# Patient Record
Sex: MALE | Race: ASIAN | Employment: FULL TIME
[De-identification: names, ages, dates, MRNs, and addresses within clinical notes are randomized per-mention and may not be internally consistent; named-entity substitution may affect disease eponyms.]

---

## 2024-08-09 ENCOUNTER — OFFICE VISIT (OUTPATIENT)
Facility: CLINIC | Age: 24
End: 2024-08-09
Payer: COMMERCIAL

## 2024-08-09 VITALS
BODY MASS INDEX: 19.37 KG/M2 | HEIGHT: 68 IN | DIASTOLIC BLOOD PRESSURE: 80 MMHG | WEIGHT: 127.8 LBS | RESPIRATION RATE: 16 BRPM | HEART RATE: 68 BPM | OXYGEN SATURATION: 98 % | SYSTOLIC BLOOD PRESSURE: 116 MMHG | TEMPERATURE: 98.6 F

## 2024-08-09 DIAGNOSIS — B00.9 HSV-1 INFECTION: ICD-10-CM

## 2024-08-09 DIAGNOSIS — K13.70 ORAL LESION: Primary | ICD-10-CM

## 2024-08-09 DIAGNOSIS — L30.9 DERMATITIS: ICD-10-CM

## 2024-08-09 PROCEDURE — 99204 OFFICE O/P NEW MOD 45 MIN: CPT | Performed by: NURSE PRACTITIONER

## 2024-08-09 SDOH — ECONOMIC STABILITY: FOOD INSECURITY: WITHIN THE PAST 12 MONTHS, THE FOOD YOU BOUGHT JUST DIDN'T LAST AND YOU DIDN'T HAVE MONEY TO GET MORE.: NEVER TRUE

## 2024-08-09 SDOH — ECONOMIC STABILITY: FOOD INSECURITY: WITHIN THE PAST 12 MONTHS, YOU WORRIED THAT YOUR FOOD WOULD RUN OUT BEFORE YOU GOT MONEY TO BUY MORE.: NEVER TRUE

## 2024-08-09 SDOH — ECONOMIC STABILITY: INCOME INSECURITY: HOW HARD IS IT FOR YOU TO PAY FOR THE VERY BASICS LIKE FOOD, HOUSING, MEDICAL CARE, AND HEATING?: NOT HARD AT ALL

## 2024-08-09 SDOH — ECONOMIC STABILITY: HOUSING INSECURITY
IN THE LAST 12 MONTHS, WAS THERE A TIME WHEN YOU DID NOT HAVE A STEADY PLACE TO SLEEP OR SLEPT IN A SHELTER (INCLUDING NOW)?: NO

## 2024-08-09 ASSESSMENT — ENCOUNTER SYMPTOMS
NAUSEA: 0
DIARRHEA: 0
VOMITING: 0
SORE THROAT: 1
SHORTNESS OF BREATH: 0
BLOOD IN STOOL: 0

## 2024-08-09 ASSESSMENT — PATIENT HEALTH QUESTIONNAIRE - PHQ9
2. FEELING DOWN, DEPRESSED OR HOPELESS: NOT AT ALL
SUM OF ALL RESPONSES TO PHQ QUESTIONS 1-9: 0
SUM OF ALL RESPONSES TO PHQ9 QUESTIONS 1 & 2: 0
SUM OF ALL RESPONSES TO PHQ QUESTIONS 1-9: 0
SUM OF ALL RESPONSES TO PHQ QUESTIONS 1-9: 0
1. LITTLE INTEREST OR PLEASURE IN DOING THINGS: NOT AT ALL
SUM OF ALL RESPONSES TO PHQ QUESTIONS 1-9: 0

## 2024-08-09 NOTE — ASSESSMENT & PLAN NOTE
Almost resolved, wound culture pending.  Advise using over-the-counter Bacitracin/polymyxin ointment prn

## 2024-08-09 NOTE — ASSESSMENT & PLAN NOTE
Cultures pending, he would like to make 1 week follow-up to discuss results.  Differential includes viral vs. bacterial, will hold on any treatment at this time

## 2024-08-09 NOTE — PROGRESS NOTES
Rosalva Toro is a 23 y.o. male     Chief Complaint   Patient presents with    New Patient     Discuss minute clinic results       /80 (Site: Left Upper Arm, Position: Sitting, Cuff Size: Medium Adult)   Pulse 68   Temp 98.6 °F (37 °C) (Oral)   Resp 16   Ht 1.727 m (5' 8\")   Wt 58 kg (127 lb 12.8 oz)   SpO2 98%   BMI 19.43 kg/m²     Health Maintenance Due   Topic Date Due    Hepatitis B vaccine (1 of 3 - 3-dose series) Never done    COVID-19 Vaccine (1) Never done    Varicella vaccine (1 of 2 - 2-dose childhood series) Never done    HPV vaccine (1 - Male 2-dose series) Never done    Depression Screen  Never done    HIV screen  Never done    Hepatitis C screen  Never done    DTaP/Tdap/Td vaccine (1 - Tdap) Never done    Flu vaccine (1) Never done         \"Have you been to the ER, urgent care clinic since your last visit?  Hospitalized since your last visit?\"    YES - When: approximately 8 days ago.  Where and Why: [facility] in mouth.    “Have you seen or consulted any other health care providers outside of [facility] since your last visit?”    NO                     
Surgical History:   Procedure Laterality Date    COLONOSCOPY  06/04/2024     Social History     Socioeconomic History    Marital status: Single   Tobacco Use    Smoking status: Never     Passive exposure: Never    Smokeless tobacco: Never   Vaping Use    Vaping Use: Never used   Substance and Sexual Activity    Alcohol use: Never    Drug use: Never     Social Determinants of Health     Financial Resource Strain: Low Risk  (8/9/2024)    Overall Financial Resource Strain (CARDIA)     Difficulty of Paying Living Expenses: Not hard at all   Food Insecurity: No Food Insecurity (8/9/2024)    Hunger Vital Sign     Worried About Running Out of Food in the Last Year: Never true     Ran Out of Food in the Last Year: Never true   Transportation Needs: Unknown (8/9/2024)    PRAPARE - Transportation     Lack of Transportation (Non-Medical): No   Housing Stability: Unknown (8/9/2024)    Housing Stability Vital Sign     Unstable Housing in the Last Year: No     No current outpatient medications on file.     No current facility-administered medications for this visit.     No Known Allergies    Prior to Admission medications    Not on File        Vitals:    08/09/24 0909   BP: 116/80   Site: Left Upper Arm   Position: Sitting   Cuff Size: Medium Adult   Pulse: 68   Resp: 16   Temp: 98.6 °F (37 °C)   TempSrc: Oral   SpO2: 98%   Weight: 58 kg (127 lb 12.8 oz)   Height: 1.727 m (5' 8\")       Body mass index is 19.43 kg/m².    Assessment and Plan    1. Oral lesion  Assessment & Plan:  Cultures pending, he would like to make 1 week follow-up to discuss results.  Differential includes viral vs. bacterial, will hold on any treatment at this time   Orders:  -     Culture, HSV, Reflex to typing; Future  -     Culture, Throat; Future  2. HSV-1 infection  Assessment & Plan:  Reviewed outside labs and discussed his results in depth.  Will discuss potential treatment as needed at follow-up   Orders:  -     Culture, HSV, Reflex to typing;

## 2024-08-09 NOTE — ASSESSMENT & PLAN NOTE
Reviewed outside labs and discussed his results in depth.  Will discuss potential treatment as needed at follow-up

## 2024-08-11 LAB
BACTERIA SPEC CULT: ABNORMAL
GRAM STN SPEC: ABNORMAL
GRAM STN SPEC: ABNORMAL
HSV SPEC CULT: NORMAL
SERVICE CMNT-IMP: ABNORMAL
SERVICE CMNT-IMP: ABNORMAL
SPECIMEN SOURCE: NORMAL

## 2024-08-12 DIAGNOSIS — J02.0 ACUTE STREPTOCOCCAL PHARYNGITIS: Primary | ICD-10-CM

## 2024-08-12 RX ORDER — CEPHALEXIN 500 MG/1
500 CAPSULE ORAL 2 TIMES DAILY
Qty: 20 CAPSULE | Refills: 0 | Status: SHIPPED | OUTPATIENT
Start: 2024-08-12 | End: 2024-08-22

## 2024-08-21 NOTE — PROGRESS NOTES
[facility] [place]  Office Note  History of present illness:Rosalva Toro is a 24 y.o. male presenting for Follow-up (Followup labs)  1 week follow-up for discussion of labs preferred by patient.  He was recently treated for strep infection of the throat and skin with cephalexin x 10 days.  Tested negative for HSV of mouth and throat.     He reports today his throat is feeling much better and skin/rash problem has improved almost resolved.  He reports still having 2 days worth of medication left.  He reports he may have burned the roof of his mouth on hot pizza.  He has new concern today for hypopigmentation of his penis he has noticed over the past couple of weeks, now pink. Associated irritation intermittently.  He denies any discharge.    Many questions and concerns surrounding his recent HSV antibody test.  He has been tested several times for additional STI-negative.  He was able to have recent male sexual encounter acquaintance test for STI.  Was positive for HSV as well, mild syphilis, and gonorrhea.  He would like to retest his HSV antibody test and receive initial treatment for first outbreak.    He denies recent fever, chills.    Review of Systems   Constitutional: Negative.    Respiratory:  Negative for shortness of breath.    Cardiovascular:  Negative for chest pain and palpitations.   Gastrointestinal:  Negative for blood in stool, diarrhea, nausea and vomiting.   Hematological:  Negative for adenopathy.         No past medical history on file.    No Known Allergies     Prior to Admission medications    Medication Sig Start Date End Date Taking? Authorizing Provider   cephALEXin (KEFLEX) 500 MG capsule Take 1 capsule by mouth 2 times daily for 10 days 8/12/24 8/22/24  Nishant Dee APRN - NP        Vitals:    08/22/24 1113   BP: 124/83   Site: Left Upper Arm   Position: Sitting   Pulse: 80   Resp: 16   Temp: 98.3 °F (36.8 °C)   SpO2: 100%

## 2024-08-22 ENCOUNTER — OFFICE VISIT (OUTPATIENT)
Facility: CLINIC | Age: 24
End: 2024-08-22
Payer: COMMERCIAL

## 2024-08-22 VITALS
TEMPERATURE: 98.3 F | RESPIRATION RATE: 16 BRPM | WEIGHT: 125.6 LBS | HEART RATE: 80 BPM | BODY MASS INDEX: 19.1 KG/M2 | OXYGEN SATURATION: 100 % | SYSTOLIC BLOOD PRESSURE: 124 MMHG | DIASTOLIC BLOOD PRESSURE: 83 MMHG

## 2024-08-22 DIAGNOSIS — R23.9 SKIN CHANGE: ICD-10-CM

## 2024-08-22 DIAGNOSIS — J02.0 ACUTE STREPTOCOCCAL PHARYNGITIS: ICD-10-CM

## 2024-08-22 DIAGNOSIS — L30.9 DERMATITIS: ICD-10-CM

## 2024-08-22 DIAGNOSIS — B00.9 HSV-1 INFECTION: Primary | ICD-10-CM

## 2024-08-22 PROCEDURE — 99214 OFFICE O/P EST MOD 30 MIN: CPT | Performed by: NURSE PRACTITIONER

## 2024-08-22 RX ORDER — VALACYCLOVIR HYDROCHLORIDE 1 G/1
1000 TABLET, FILM COATED ORAL 2 TIMES DAILY
Qty: 20 TABLET | Refills: 0 | Status: SHIPPED | OUTPATIENT
Start: 2024-08-22 | End: 2024-09-01

## 2024-08-22 ASSESSMENT — ENCOUNTER SYMPTOMS
BLOOD IN STOOL: 0
VOMITING: 0
DIARRHEA: 0
NAUSEA: 0
SHORTNESS OF BREATH: 0

## 2024-08-22 NOTE — PATIENT INSTRUCTIONS
[facility] [phone]   [facility] [phone]  [facility]  [phone]   [facility] [place]  [phone]

## 2024-08-22 NOTE — PROGRESS NOTES
Chief Complaint   Patient presents with    Follow-up     Followup labs    1. Have you been to the ER, urgent care clinic since your last visit?  Hospitalized since your last visit?no    2. Have you seen or consulted any other health care providers outside of the [facility] since your last visit?  Include any pap smears or colon screening. no

## 2024-08-25 LAB
HSV1 IGG SER IA-ACNC: 2.44 INDEX (ref 0–0.9)
HSV2 IGG SER IA-ACNC: <0.91 INDEX (ref 0–0.9)

## 2024-08-26 ENCOUNTER — TELEPHONE (OUTPATIENT)
Facility: CLINIC | Age: 24
End: 2024-08-26

## 2024-08-26 NOTE — TELEPHONE ENCOUNTER
Patient wants to know if he should start the antiviral now or wait since the IGG is still below 3.55. Also, if the antiviral is started, should he push Friday's appt back?

## 2024-08-26 NOTE — TELEPHONE ENCOUNTER
Patient is requesting a call back from JUNE Dee. He saw his lab results in My Chart and would like to know when to start his medication. Also he would like to know if he needs to reschedule his nurse visit on 8/30/24.     [phone]  He will not be available 2:30- 3:00 pm.

## 2024-09-16 DIAGNOSIS — B00.9 HERPES SIMPLEX: Primary | ICD-10-CM

## 2024-09-16 RX ORDER — VALACYCLOVIR HYDROCHLORIDE 1 G/1
1000 TABLET, FILM COATED ORAL 2 TIMES DAILY
Qty: 20 TABLET | Refills: 0 | Status: SHIPPED | OUTPATIENT
Start: 2024-09-16 | End: 2024-09-26

## 2024-09-20 ENCOUNTER — OFFICE VISIT (OUTPATIENT)
Facility: CLINIC | Age: 24
End: 2024-09-20
Payer: COMMERCIAL

## 2024-09-20 VITALS
HEIGHT: 68 IN | BODY MASS INDEX: 19.32 KG/M2 | WEIGHT: 127.5 LBS | DIASTOLIC BLOOD PRESSURE: 72 MMHG | TEMPERATURE: 98.9 F | RESPIRATION RATE: 16 BRPM | HEART RATE: 82 BPM | SYSTOLIC BLOOD PRESSURE: 128 MMHG | OXYGEN SATURATION: 98 %

## 2024-09-20 DIAGNOSIS — B00.9 HSV-1 INFECTION: Primary | ICD-10-CM

## 2024-09-20 DIAGNOSIS — R45.89 ANXIETY ABOUT HEALTH: ICD-10-CM

## 2024-09-20 DIAGNOSIS — R61 NIGHT SWEATS: ICD-10-CM

## 2024-09-20 PROCEDURE — 99213 OFFICE O/P EST LOW 20 MIN: CPT | Performed by: NURSE PRACTITIONER

## 2024-09-23 LAB
BASOPHILS # BLD: 0.1 K/UL (ref 0–0.1)
BASOPHILS NFR BLD: 1 % (ref 0–1)
DIFFERENTIAL METHOD BLD: NORMAL
EOSINOPHIL # BLD: 0.1 K/UL (ref 0–0.4)
EOSINOPHIL NFR BLD: 2 % (ref 0–7)
ERYTHROCYTE [DISTWIDTH] IN BLOOD BY AUTOMATED COUNT: 12.6 % (ref 11.5–14.5)
HCT VFR BLD AUTO: 43.5 % (ref 36.6–50.3)
HGB BLD-MCNC: 14.4 G/DL (ref 12.1–17)
HIV 1+2 AB+HIV1 P24 AG SERPL QL IA: NONREACTIVE
HIV 1/2 RESULT COMMENT: NORMAL
IMM GRANULOCYTES # BLD AUTO: 0 K/UL (ref 0–0.04)
IMM GRANULOCYTES NFR BLD AUTO: 0 % (ref 0–0.5)
LYMPHOCYTES # BLD: 2.9 K/UL (ref 0.8–3.5)
LYMPHOCYTES NFR BLD: 43 % (ref 12–49)
MCH RBC QN AUTO: 28.9 PG (ref 26–34)
MCHC RBC AUTO-ENTMCNC: 33.1 G/DL (ref 30–36.5)
MCV RBC AUTO: 87.3 FL (ref 80–99)
MONOCYTES # BLD: 0.4 K/UL (ref 0–1)
MONOCYTES NFR BLD: 6 % (ref 5–13)
NEUTS SEG # BLD: 3.3 K/UL (ref 1.8–8)
NEUTS SEG NFR BLD: 48 % (ref 32–75)
NRBC # BLD: 0 K/UL (ref 0–0.01)
NRBC BLD-RTO: 0 PER 100 WBC
PLATELET # BLD AUTO: 289 K/UL (ref 150–400)
PMV BLD AUTO: 11 FL (ref 8.9–12.9)
RBC # BLD AUTO: 4.98 M/UL (ref 4.1–5.7)
RPR SER QL: NONREACTIVE
WBC # BLD AUTO: 6.9 K/UL (ref 4.1–11.1)

## 2024-09-25 LAB
C TRACH RRNA SPEC QL NAA+PROBE: NEGATIVE
GAMMA INTERFERON BACKGROUND BLD IA-ACNC: 0.02 IU/ML
M TB IFN-G BLD-IMP: NEGATIVE
M TB IFN-G CD4+ BCKGRND COR BLD-ACNC: 0.04 IU/ML
M TB IFN-G CD4+CD8+ BCKGRND COR BLD-ACNC: 0.04 IU/ML
MITOGEN IGNF BCKGRD COR BLD-ACNC: >10 IU/ML
N GONORRHOEA RRNA SPEC QL NAA+PROBE: NEGATIVE
SERVICE CMNT-IMP: NORMAL
SPECIMEN SOURCE: NORMAL
T VAGINALIS RRNA SPEC QL NAA+PROBE: NEGATIVE

## 2024-10-07 ENCOUNTER — PATIENT MESSAGE (OUTPATIENT)
Facility: CLINIC | Age: 24
End: 2024-10-07

## 2024-10-07 ENCOUNTER — OFFICE VISIT (OUTPATIENT)
Facility: CLINIC | Age: 24
End: 2024-10-07
Payer: COMMERCIAL

## 2024-10-07 VITALS
OXYGEN SATURATION: 98 % | BODY MASS INDEX: 19.43 KG/M2 | HEIGHT: 68 IN | WEIGHT: 128.2 LBS | HEART RATE: 81 BPM | SYSTOLIC BLOOD PRESSURE: 128 MMHG | RESPIRATION RATE: 16 BRPM | DIASTOLIC BLOOD PRESSURE: 74 MMHG | TEMPERATURE: 98.7 F

## 2024-10-07 DIAGNOSIS — L65.9 HAIR LOSS: Primary | ICD-10-CM

## 2024-10-07 DIAGNOSIS — K13.0 LIP LESION: ICD-10-CM

## 2024-10-07 DIAGNOSIS — L98.9 SKIN PROBLEM: ICD-10-CM

## 2024-10-07 DIAGNOSIS — L08.9 STREPTOCOCCAL SKIN INFECTION: Primary | ICD-10-CM

## 2024-10-07 DIAGNOSIS — B00.9 HSV-1 INFECTION: ICD-10-CM

## 2024-10-07 PROCEDURE — 99213 OFFICE O/P EST LOW 20 MIN: CPT | Performed by: NURSE PRACTITIONER

## 2024-10-07 RX ORDER — MUPIROCIN 20 MG/G
OINTMENT TOPICAL
Qty: 30 G | Refills: 0 | Status: SHIPPED | OUTPATIENT
Start: 2024-10-07 | End: 2024-10-14

## 2024-10-07 RX ORDER — VALACYCLOVIR HYDROCHLORIDE 1 G/1
TABLET, FILM COATED ORAL
Qty: 2 TABLET | Refills: 5 | Status: SHIPPED | OUTPATIENT
Start: 2024-10-07

## 2024-10-07 NOTE — PATIENT INSTRUCTIONS
Community [facility] practices:  [facility] [phone]   [facility] [phone]  [facility]  [phone]   [facility] [place]  [phone]   [facility], [facility] [phone]  [facility] [phone]

## 2024-10-07 NOTE — PROGRESS NOTES
Rosalva Toro is a 24 y.o. male     Chief Complaint   Patient presents with    Follow-up     Postive strep C throat culture       /74 (Site: Left Upper Arm, Position: Sitting, Cuff Size: Medium Adult)   Pulse 81   Temp 98.7 °F (37.1 °C) (Oral)   Resp 16   Ht 1.727 m (5' 8\")   Wt 58.2 kg (128 lb 3.2 oz)   SpO2 98%   BMI 19.49 kg/m²     Health Maintenance Due   Topic Date Due    Varicella vaccine (1 of 2 - 13+ 2-dose series) Never done    HPV vaccine (1 - Male 3-dose series) Never done    Hepatitis C screen  Never done    Hepatitis B vaccine (1 of 3 - 19+ 3-dose series) Never done    DTaP/Tdap/Td vaccine (1 - Tdap) Never done    Flu vaccine (1) Never done    COVID-19 Vaccine (1 - 2023-24 season) Never done         \"Have you been to the ER, urgent care clinic since your last visit?  Hospitalized since your last visit?\"    NO    “Have you seen or consulted any other health care providers outside of [facility] since your last visit?”    NO

## 2024-10-07 NOTE — PROGRESS NOTES
[facility] [place]  Office Note  Please note that this dictation was completed with [employer], the computer voice recognition software.  Quite often unanticipated grammatical, syntax, homophones, and other interpretive errors are inadvertently transcribed by the computer software.  Please disregard these errors.  Please excuse any errors that have escaped final proofreading.       History of present illness:Rosalva Toro is a 24 y.o. male presenting for Follow-up (Postive strep C throat culture)    Hair loss  He reports his pope is not as full    Skin issue/abnormal culture  He reports he went to another PCP office and had a throat culture done and still showing strep.  Penicillin was called in but he never picked up prescription.  He reports he still has itching above the buttocks.    Lip lesion  He reports 1 spot above the right lip that he can squeeze white substance out of.    He would like a Rx of Valtrex to keep on hand for potential HSV outbreak.    Review of Systems      History reviewed. No pertinent past medical history.    No Known Allergies     Prior to Admission medications    Not on File        Vitals:    10/07/24 0817   BP: 128/74   Site: Left Upper Arm   Position: Sitting   Cuff Size: Medium Adult   Pulse: 81   Resp: 16   Temp: 98.7 °F (37.1 °C)   TempSrc: Oral   SpO2: 98%   Weight: 58.2 kg (128 lb 3.2 oz)   Height: 1.727 m (5' 8\")       Body mass index is 19.49 kg/m².  Last Weight Metrics:      10/7/2024     8:17 AM 9/20/2024     3:12 PM 8/22/2024    11:13 AM 8/9/2024     9:09 AM   Weight Loss Metrics   Height 5' 8\" 5' 8\"  5' 8\"   Weight - Scale 128 lbs 3 oz 127 lbs 8 oz 125 lbs 10 oz 127 lbs 13 oz   BMI (Calculated) 19.5 kg/m2 19.4 kg/m2 0 kg/m2 19.5 kg/m2      Objective  Physical Exam  Vitals and nursing note reviewed.   Constitutional:       Appearance: He is not ill-appearing.   HENT:      Mouth/Throat:      Lips: No lesions.      Mouth: Mucous

## 2024-10-10 ENCOUNTER — CLINICAL DOCUMENTATION (OUTPATIENT)
Facility: CLINIC | Age: 24
End: 2024-10-10

## 2024-10-10 ENCOUNTER — NURSE ONLY (OUTPATIENT)
Facility: CLINIC | Age: 24
End: 2024-10-10
Payer: COMMERCIAL

## 2024-10-10 VITALS
DIASTOLIC BLOOD PRESSURE: 70 MMHG | SYSTOLIC BLOOD PRESSURE: 124 MMHG | OXYGEN SATURATION: 98 % | WEIGHT: 128 LBS | BODY MASS INDEX: 19.4 KG/M2 | HEART RATE: 72 BPM | TEMPERATURE: 98.8 F | HEIGHT: 68 IN | RESPIRATION RATE: 16 BRPM

## 2024-10-10 DIAGNOSIS — Z23 ENCOUNTER FOR ADMINISTRATION OF VACCINE: ICD-10-CM

## 2024-10-10 DIAGNOSIS — R73.01 IMPAIRED FASTING BLOOD SUGAR: Primary | ICD-10-CM

## 2024-10-10 PROCEDURE — 90651 9VHPV VACCINE 2/3 DOSE IM: CPT | Performed by: NURSE PRACTITIONER

## 2024-10-10 PROCEDURE — 90471 IMMUNIZATION ADMIN: CPT | Performed by: NURSE PRACTITIONER

## 2024-10-10 NOTE — PROGRESS NOTES
Rosalva Toro is a 24 y.o. male     Chief Complaint   Patient presents with    Injections     HPV #1       /70 (Site: Left Upper Arm, Position: Sitting, Cuff Size: Medium Adult)   Pulse 72   Temp 98.8 °F (37.1 °C) (Oral)   Resp 16   Ht 1.727 m (5' 8\")   Wt 58.1 kg (128 lb)   SpO2 98%   BMI 19.46 kg/m²     Health Maintenance Due   Topic Date Due    Varicella vaccine (1 of 2 - 13+ 2-dose series) Never done    HPV vaccine (1 - Male 3-dose series) Never done    Hepatitis C screen  Never done    Hepatitis B vaccine (1 of 3 - 19+ 3-dose series) Never done    DTaP/Tdap/Td vaccine (1 - Tdap) Never done    Flu vaccine (1) Never done    COVID-19 Vaccine (1 - 2023-24 season) Never done         \"Have you been to the ER, urgent care clinic since your last visit?  Hospitalized since your last visit?\"    NO    “Have you seen or consulted any other health care providers outside of [facility] since your last visit?”    NO    Administered HPV #1 in left deltoid. Patient tolerated injection well.    Lot#:[id number]    Exp:February 12, 2025    NDC:3572-6109-23

## 2024-10-10 NOTE — PROGRESS NOTES
Patient came in to office today for a Nurse Visit and requested assistance to see if any records from Previous PCP were noted and whether his last visit with them was billed as a CPE.  Previous PCP not in BS system.

## 2024-10-11 LAB
EST. AVERAGE GLUCOSE BLD GHB EST-MCNC: 100 MG/DL
HBA1C MFR BLD: 5.1 % (ref 4–5.6)

## 2024-10-28 NOTE — PROGRESS NOTES
[facility] [place]  Office Note  Please note that this dictation was completed with [employer], the computer voice recognition software.  Quite often unanticipated grammatical, syntax, homophones, and other interpretive errors are inadvertently transcribed by the computer software.  Please disregard these errors.  Please excuse any errors that have escaped final proofreading.       History of present illness:Rosalva Toro is a 24 y.o. male presenting for Follow-up    He wants to discuss recent lab results.    Abnormal CBC indices at previous primary care office, most recent and additional testing normal.    He is not immune to hepatitis B.  He reports he may have had 2 series of hepatitis B.  He will send records.    Continues with some itching above the buttocks.  Has been using bacitracin.    C/o Bad taste-sour, when swallowing.  Prior problems with GERD but has improved since changing diet.  He reports continues with some redness intermittently in the throat  He would like me to review his colonoscopy this past year.    Orthodontists yesterday  Will be fitted for Invisalign  History of increased oral care    history of HSV  May or may not have had cold sore    Denies new sexual contacts  Interested in serial STD testing      Review of Systems   Constitutional: Negative.    Respiratory:  Negative for shortness of breath.    Cardiovascular:  Negative for chest pain.         No past medical history on file.    No Known Allergies     Prior to Admission medications    Medication Sig Start Date End Date Taking? Authorizing Provider   valACYclovir (VALTREX) 1 g tablet Take 1 tablet twice a day for 1 day for cold sore  Patient not taking: Reported on 10/10/2024 10/7/24   Nishant Dee APRN - NP        Vitals:    10/30/24 1437   BP: 116/80   Site: Left Upper Arm   Position: Sitting   Cuff Size: Medium Adult   Pulse: 99   Resp: 16   Temp: 98.5 °F (36.9 °C)   TempSrc:

## 2024-10-30 ENCOUNTER — OFFICE VISIT (OUTPATIENT)
Facility: CLINIC | Age: 24
End: 2024-10-30
Payer: COMMERCIAL

## 2024-10-30 VITALS
RESPIRATION RATE: 16 BRPM | HEIGHT: 68 IN | OXYGEN SATURATION: 98 % | SYSTOLIC BLOOD PRESSURE: 116 MMHG | TEMPERATURE: 98.5 F | DIASTOLIC BLOOD PRESSURE: 80 MMHG | HEART RATE: 99 BPM | BODY MASS INDEX: 18.57 KG/M2 | WEIGHT: 122.5 LBS

## 2024-10-30 DIAGNOSIS — R73.01 IMPAIRED FASTING BLOOD SUGAR: Primary | ICD-10-CM

## 2024-10-30 DIAGNOSIS — B00.9 HSV-1 INFECTION: ICD-10-CM

## 2024-10-30 DIAGNOSIS — Z87.19 HISTORY OF HEMORRHOIDS: ICD-10-CM

## 2024-10-30 DIAGNOSIS — L30.9 DERMATITIS: ICD-10-CM

## 2024-10-30 DIAGNOSIS — R68.89 THROAT SYMPTOM: ICD-10-CM

## 2024-10-30 PROCEDURE — 99214 OFFICE O/P EST MOD 30 MIN: CPT | Performed by: NURSE PRACTITIONER

## 2024-10-30 RX ORDER — CLOTRIMAZOLE AND BETAMETHASONE DIPROPIONATE 10; .64 MG/G; MG/G
CREAM TOPICAL
Qty: 30 G | Refills: 0 | Status: SHIPPED | OUTPATIENT
Start: 2024-10-30

## 2024-10-30 ASSESSMENT — ENCOUNTER SYMPTOMS: SHORTNESS OF BREATH: 0

## 2024-10-30 NOTE — PROGRESS NOTES
Rosalva Toro is a 24 y.o. male     No chief complaint on file.      /80 (Site: Left Upper Arm, Position: Sitting, Cuff Size: Medium Adult)   Pulse 99   Temp 98.5 °F (36.9 °C) (Oral)   Resp 16   Ht 1.727 m (5' 8\")   Wt 55.6 kg (122 lb 8 oz)   SpO2 98%   BMI 18.63 kg/m²     Health Maintenance Due   Topic Date Due    Varicella vaccine (1 of 2 - 13+ 2-dose series) Never done    Hepatitis C screen  Never done    Hepatitis B vaccine (1 of 3 - 19+ 3-dose series) Never done    DTaP/Tdap/Td vaccine (1 - Tdap) Never done    Flu vaccine (1) Never done    COVID-19 Vaccine (1 - 2023-24 season) Never done         \"Have you been to the ER, urgent care clinic since your last visit?  Hospitalized since your last visit?\"    NO    “Have you seen or consulted any other health care providers outside of [facility] since your last visit?”    NO

## 2024-11-02 LAB
BACTERIA SPEC CULT: ABNORMAL
BACTERIA SPEC CULT: ABNORMAL
SERVICE CMNT-IMP: ABNORMAL

## 2024-11-04 DIAGNOSIS — J02.0 STREPTOCOCCUS PHARYNGITIS: Primary | ICD-10-CM

## 2024-11-04 DIAGNOSIS — R07.0 CHRONIC THROAT PAIN: ICD-10-CM

## 2024-11-04 DIAGNOSIS — G89.29 CHRONIC THROAT PAIN: ICD-10-CM

## 2024-11-04 RX ORDER — CEPHALEXIN 500 MG/1
500 CAPSULE ORAL 2 TIMES DAILY
Qty: 20 CAPSULE | Refills: 0 | Status: SHIPPED | OUTPATIENT
Start: 2024-11-04 | End: 2024-11-14
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
## 2024-12-10 ENCOUNTER — NURSE ONLY (OUTPATIENT)
Facility: CLINIC | Age: 24
End: 2024-12-10

## 2024-12-10 ENCOUNTER — OFFICE VISIT (OUTPATIENT)
Facility: CLINIC | Age: 24
End: 2024-12-10

## 2024-12-10 VITALS
HEIGHT: 68 IN | WEIGHT: 124 LBS | RESPIRATION RATE: 16 BRPM | BODY MASS INDEX: 18.79 KG/M2 | OXYGEN SATURATION: 98 % | SYSTOLIC BLOOD PRESSURE: 122 MMHG | DIASTOLIC BLOOD PRESSURE: 78 MMHG | TEMPERATURE: 98.3 F | HEART RATE: 83 BPM

## 2024-12-10 DIAGNOSIS — R53.83 OTHER FATIGUE: Primary | ICD-10-CM

## 2024-12-10 DIAGNOSIS — L30.9 DERMATITIS: ICD-10-CM

## 2024-12-10 DIAGNOSIS — Z72.51 HIGH RISK SEXUAL BEHAVIOR, UNSPECIFIED TYPE: ICD-10-CM

## 2024-12-10 RX ORDER — CLOTRIMAZOLE AND BETAMETHASONE DIPROPIONATE 10; .64 MG/G; MG/G
CREAM TOPICAL
Qty: 30 G | Refills: 1 | Status: SHIPPED | OUTPATIENT
Start: 2024-12-10

## 2024-12-10 ASSESSMENT — ENCOUNTER SYMPTOMS: SHORTNESS OF BREATH: 0

## 2024-12-10 NOTE — PROGRESS NOTES
Rosalva Toro is a 24 y.o. male     Chief Complaint   Patient presents with    Throat Pain     Itching (Buttocks)  STD testing       /78 (Site: Left Upper Arm, Position: Sitting, Cuff Size: Medium Adult)   Pulse 83   Temp 98.3 °F (36.8 °C) (Oral)   Resp 16   Ht 1.727 m (5' 8\")   Wt 56.2 kg (124 lb)   SpO2 98%   BMI 18.85 kg/m²     Health Maintenance Due   Topic Date Due    Varicella vaccine (1 of 2 - 13+ 2-dose series) Never done    Hepatitis C screen  Never done    Hepatitis B vaccine (1 of 3 - 19+ 3-dose series) Never done    DTaP/Tdap/Td vaccine (1 - Tdap) Never done    Flu vaccine (1) Never done    COVID-19 Vaccine (1 - 2023-24 season) Never done    HPV vaccine (2 - Male 3-dose series) 11/07/2024         \"Have you been to the ER, urgent care clinic since your last visit?  Hospitalized since your last visit?\"    NO    “Have you seen or consulted any other health care providers outside of [facility] since your last visit?”    NO    Administered HPV in left deloid. Patient tolerated injection well.    Lot#:[id number]    Exp:Feb 12, 2025    NDC:9537-7511-17

## 2024-12-12 DIAGNOSIS — E53.8 FOLIC ACID DEFICIENCY: Primary | ICD-10-CM

## 2024-12-12 DIAGNOSIS — E55.9 VITAMIN D DEFICIENCY: ICD-10-CM

## 2024-12-12 LAB
25(OH)D3 SERPL-MCNC: <9 NG/ML (ref 30–100)
C PEPTIDE SERPL-MCNC: 1.5 NG/ML (ref 1.1–4.4)
FOLATE SERPL-MCNC: 4.5 NG/ML (ref 5–21)
HCV AB SER IA-ACNC: 0.15 INDEX
HCV AB SERPL QL IA: NONREACTIVE
HIV 1+2 AB+HIV1 P24 AG SERPL QL IA: NONREACTIVE
HIV 1/2 RESULT COMMENT: NORMAL
INSULIN SERPL-ACNC: 6.5 UIU/ML (ref 2.6–24.9)
RPR SER QL: NONREACTIVE
VIT B12 SERPL-MCNC: 324 PG/ML (ref 193–986)

## 2024-12-12 RX ORDER — FOLIC ACID 1 MG/1
1 TABLET ORAL DAILY
Qty: 90 TABLET | Refills: 1 | Status: SHIPPED | OUTPATIENT
Start: 2024-12-12

## 2024-12-12 RX ORDER — ERGOCALCIFEROL 1.25 MG/1
50000 CAPSULE, LIQUID FILLED ORAL WEEKLY
Qty: 12 CAPSULE | Refills: 0 | Status: SHIPPED | OUTPATIENT
Start: 2024-12-12 | End: 2025-02-28

## 2024-12-12 NOTE — RESULT ENCOUNTER NOTE
Vitamin D is very low.  I will send in a prescription to take once weekly for the next few months.  Your folic acid level is low.  I will also send prescription for folic acid.  These are common vitamin deficiencies people can lack in her diet.  So far, you have tested negative for hepatitis C, syphilis, HIV.  Additional tests are pending.    IVA STONE - NP

## 2024-12-13 LAB
C TRACH RRNA SPEC QL NAA+PROBE: NEGATIVE
N GONORRHOEA RRNA SPEC QL NAA+PROBE: NEGATIVE
SPECIMEN SOURCE: NORMAL
T VAGINALIS RRNA SPEC QL NAA+PROBE: NEGATIVE

## 2025-01-10 DIAGNOSIS — E53.8 FOLIC ACID DEFICIENCY: ICD-10-CM

## 2025-01-10 DIAGNOSIS — E55.9 VITAMIN D DEFICIENCY: ICD-10-CM

## 2025-01-10 DIAGNOSIS — L30.9 DERMATITIS: ICD-10-CM

## 2025-01-10 RX ORDER — CLOTRIMAZOLE AND BETAMETHASONE DIPROPIONATE 10; .64 MG/G; MG/G
CREAM TOPICAL
Qty: 30 G | Refills: 1 | Status: SHIPPED | OUTPATIENT
Start: 2025-01-10

## 2025-01-10 RX ORDER — ERGOCALCIFEROL 1.25 MG/1
50000 CAPSULE, LIQUID FILLED ORAL WEEKLY
Qty: 12 CAPSULE | Refills: 0 | Status: SHIPPED | OUTPATIENT
Start: 2025-01-10 | End: 2025-03-29

## 2025-01-10 RX ORDER — FOLIC ACID 1 MG/1
1 TABLET ORAL DAILY
Qty: 90 TABLET | Refills: 1 | Status: SHIPPED | OUTPATIENT
Start: 2025-01-10

## 2025-01-10 NOTE — TELEPHONE ENCOUNTER
PCP: Nishant Dee APRN - NP    Last appt: 12/10/2024    Future Appointments   Date Time Provider Department Center   3/10/2025  3:00 PM Nishant Dee APRN - NP [facility] DEP   4/10/2025  1:00 PM NURSE VISIT [facility]       Requested Prescriptions     Pending Prescriptions Disp Refills    clotrimazole-betamethasone (LOTRISONE) 1-0.05 % cream 30 g 1     Sig: Apply topically 2 times daily.    vitamin D (ERGOCALCIFEROL) 1.25 MG (72992 UT) CAPS capsule 12 capsule 0     Sig: Take 1 capsule by mouth once a week for 12 doses    folic acid (FOLVITE) 1 MG tablet 90 tablet 1     Sig: Take 1 tablet by mouth daily

## 2025-03-10 ENCOUNTER — OFFICE VISIT (OUTPATIENT)
Age: 25
End: 2025-03-10
Payer: COMMERCIAL

## 2025-03-10 ENCOUNTER — OFFICE VISIT (OUTPATIENT)
Facility: CLINIC | Age: 25
End: 2025-03-10
Payer: COMMERCIAL

## 2025-03-10 VITALS
DIASTOLIC BLOOD PRESSURE: 81 MMHG | HEIGHT: 68 IN | HEART RATE: 78 BPM | BODY MASS INDEX: 19.1 KG/M2 | OXYGEN SATURATION: 98 % | WEIGHT: 126 LBS | TEMPERATURE: 97.9 F | SYSTOLIC BLOOD PRESSURE: 131 MMHG

## 2025-03-10 VITALS
WEIGHT: 128.6 LBS | SYSTOLIC BLOOD PRESSURE: 124 MMHG | RESPIRATION RATE: 16 BRPM | DIASTOLIC BLOOD PRESSURE: 76 MMHG | HEIGHT: 68 IN | TEMPERATURE: 99.2 F | BODY MASS INDEX: 19.49 KG/M2 | OXYGEN SATURATION: 98 % | HEART RATE: 72 BPM

## 2025-03-10 DIAGNOSIS — E55.9 VITAMIN D DEFICIENCY: ICD-10-CM

## 2025-03-10 DIAGNOSIS — Z72.51 HIGH RISK SEXUAL BEHAVIOR, UNSPECIFIED TYPE: Primary | ICD-10-CM

## 2025-03-10 DIAGNOSIS — K40.90 LEFT INGUINAL HERNIA: Primary | ICD-10-CM

## 2025-03-10 DIAGNOSIS — E53.8 FOLIC ACID DEFICIENCY: ICD-10-CM

## 2025-03-10 PROCEDURE — 99213 OFFICE O/P EST LOW 20 MIN: CPT | Performed by: NURSE PRACTITIONER

## 2025-03-10 PROCEDURE — 99204 OFFICE O/P NEW MOD 45 MIN: CPT | Performed by: SURGERY

## 2025-03-10 RX ORDER — ERGOCALCIFEROL 1.25 MG/1
50000 CAPSULE, LIQUID FILLED ORAL WEEKLY
Qty: 12 CAPSULE | Refills: 0 | Status: SHIPPED | OUTPATIENT
Start: 2025-03-10 | End: 2025-05-27

## 2025-03-10 RX ORDER — FOLIC ACID 1 MG/1
1 TABLET ORAL DAILY
Qty: 90 TABLET | Refills: 1 | Status: SHIPPED | OUTPATIENT
Start: 2025-03-10

## 2025-03-10 SDOH — ECONOMIC STABILITY: FOOD INSECURITY: WITHIN THE PAST 12 MONTHS, YOU WORRIED THAT YOUR FOOD WOULD RUN OUT BEFORE YOU GOT MONEY TO BUY MORE.: NEVER TRUE

## 2025-03-10 SDOH — ECONOMIC STABILITY: FOOD INSECURITY: WITHIN THE PAST 12 MONTHS, THE FOOD YOU BOUGHT JUST DIDN'T LAST AND YOU DIDN'T HAVE MONEY TO GET MORE.: NEVER TRUE

## 2025-03-10 ASSESSMENT — PATIENT HEALTH QUESTIONNAIRE - PHQ9
SUM OF ALL RESPONSES TO PHQ QUESTIONS 1-9: 0
SUM OF ALL RESPONSES TO PHQ QUESTIONS 1-9: 0
2. FEELING DOWN, DEPRESSED OR HOPELESS: NOT AT ALL
1. LITTLE INTEREST OR PLEASURE IN DOING THINGS: NOT AT ALL
SUM OF ALL RESPONSES TO PHQ QUESTIONS 1-9: 0
SUM OF ALL RESPONSES TO PHQ QUESTIONS 1-9: 0

## 2025-03-10 ASSESSMENT — ENCOUNTER SYMPTOMS
SORE THROAT: 1
SHORTNESS OF BREATH: 0

## 2025-03-10 NOTE — PROGRESS NOTES
Rosalva Toro is a 24 y.o. male     Chief Complaint   Patient presents with    Rash     Rash on stomach x 3 weeks       /76 (BP Site: Left Upper Arm, Patient Position: Sitting, BP Cuff Size: Medium Adult)   Pulse 72   Temp 99.2 °F (37.3 °C) (Oral)   Resp 16   Ht 1.727 m (5' 8\")   Wt 58.3 kg (128 lb 9.6 oz)   SpO2 98%   BMI 19.55 kg/m²     Health Maintenance Due   Topic Date Due    Varicella vaccine (1 of 2 - 13+ 2-dose series) Never done    Hepatitis B vaccine (1 of 3 - 19+ 3-dose series) Never done    DTaP/Tdap/Td vaccine (1 - Tdap) Never done    Flu vaccine (1) Never done    COVID-19 Vaccine (1 - 2024-25 season) Never done         \"Have you been to the ER, urgent care clinic since your last visit?  Hospitalized since your last visit?\"    YES - When: approximately 3  weeks ago.  Where and Why: [facility].    “Have you seen or consulted any other health care providers outside of [facility] since your last visit?”    NO                     
lbs 124 lbs 122 lbs 8 oz 128 lbs 128 lbs 3 oz 127 lbs 8 oz   BMI (Calculated) 19.6 kg/m2 19.2 kg/m2 18.9 kg/m2 18.7 kg/m2 19.5 kg/m2 19.5 kg/m2 19.4 kg/m2      Objective  Physical Exam  Vitals and nursing note reviewed.   Constitutional:       Appearance: He is not ill-appearing.   HENT:      Mouth/Throat:      Comments: Trace erythema over soft palate  Cardiovascular:      Rate and Rhythm: Normal rate and regular rhythm.   Pulmonary:      Effort: Pulmonary effort is normal. No respiratory distress.      Breath sounds: Normal breath sounds.   Neurological:      Mental Status: He is alert.         Assessment and Plan    1. High risk sexual behavior, unspecified type  -     HIV 1/2 Ag/Ab, 4TH Generation,W Rflx Confirm; Future  -     RPR; Future  -     Chlamydia, Gonorrhea, Trichomoniasis; Future  -     Neisseria Gonorrhoeae, KERRIE; Future  -     Hepatitis C Antibody; Future  Stable condition, no new exposures.  2. Vitamin D deficiency  -     vitamin D (ERGOCALCIFEROL) 1.25 MG (22664 UT) CAPS capsule; Take 1 capsule by mouth once a week for 12 doses, Disp-12 capsule, R-0Normal  3. Folic acid deficiency  -     folic acid (FOLVITE) 1 MG tablet; Take 1 tablet by mouth daily, Disp-90 tablet, R-1Normal       Diagnosis and plan discussed with patient who verbillized understanding.  Return in about 3 months (around 6/10/2025) for non fasting follow up.   IVA STONE - NP

## 2025-03-10 NOTE — PROGRESS NOTES
The patient (or guardian, if applicable) and other individuals in attendance with the patient were advised that Artificial Intelligence will be utilized during this visit to record and process the conversation to generate a clinical note. The patient (or guardian, if applicable) and other individuals in attendance at the appointment consented to the use of AI, including the recording. Other portions were at least partially completed with Dragon, the computer voice recognition software.  Quite often unanticipated grammatical, syntax, homophones, and other interpretive errors are inadvertently transcribed by the software.  Please disregard these errors.  Please excuse any errors that have escaped final proofreading.      Encounter Date: 3/10/2025  History and Physical    Referring provider:  KIMBERLY Oliveira   PCP:  Nishant Dee APRN - NP  From:  Alex Niño MD  Thank you.    Assessment & Plan  1.  Left inguinal hernia.  The hernia is symptomatic, presenting with pain and discomfort, particularly during bowel movements. It extends into the scrotum but does not involve the intestine, reducing the urgency for immediate intervention. The presence of fatty tissue following the path of the spermatic cord into the scrotum was confirmed through physical examination and ultrasound. A comprehensive discussion regarding the nature of the hernia, its potential complications, and the benefits of laparoscopic repair was conducted. The use of mesh in the procedure was explained, including its role in providing a framework for collagen growth and reducing the recurrence rate. He was informed that the procedure would be performed under general anesthesia and that he could resume normal activities, excluding heavy lifting and high-impact exercises, after a recovery period of 6 weeks. Postoperative care instructions were provided, including the use of absorbable sutures and waterproof glue dressing, and the need

## 2025-03-10 NOTE — PROGRESS NOTES
Identified pt with two pt identifiers (name and ). Reviewed chart in preparation for visit and have obtained necessary documentation.    Rosalva Toro is a 24 y.o. male  Chief Complaint   Patient presents with    Hernia     Seen at the request of Nishant Dee evaluate left inguinal hernia      /81 (BP Site: Right Upper Arm, Patient Position: Sitting, BP Cuff Size: Small Adult)   Pulse 78   Temp 97.9 °F (36.6 °C) (Temporal)   Ht 1.727 m (5' 8\")   Wt 57.2 kg (126 lb)   SpO2 98%   BMI 19.16 kg/m²     1. Have you been to the ER, urgent care clinic since your last visit?  Hospitalized since your last visit?no    2. Have you seen or consulted any other health care providers outside of the [facility] since your last visit?  Include any pap smears or colon screening. no

## 2025-03-11 ENCOUNTER — PREP FOR PROCEDURE (OUTPATIENT)
Age: 25
End: 2025-03-11

## 2025-03-11 DIAGNOSIS — K40.90 LEFT INGUINAL HERNIA: ICD-10-CM

## 2025-03-11 LAB
HCV AB SER IA-ACNC: 0.11 INDEX
HCV AB SERPL QL IA: NONREACTIVE
HIV 1+2 AB+HIV1 P24 AG SERPL QL IA: NONREACTIVE
HIV 1/2 RESULT COMMENT: NORMAL
RPR SER QL: NONREACTIVE

## 2025-03-12 ENCOUNTER — TELEPHONE (OUTPATIENT)
Age: 25
End: 2025-03-12

## 2025-03-12 NOTE — TELEPHONE ENCOUNTER
Spoke with patient OK to take penicillin for streph throat, DOS 03/27/2025, wash with antibacteria soap night before surgery. Express understanding.

## 2025-03-12 NOTE — TELEPHONE ENCOUNTER
Pcp may be prescribing penicillin for strep throat has surgery scheduled for 3.27.25 also has question about soap to wash in before surgery please advise    [phone]

## 2025-03-13 ENCOUNTER — RESULTS FOLLOW-UP (OUTPATIENT)
Facility: CLINIC | Age: 25
End: 2025-03-13

## 2025-03-13 LAB
N GONORRHOEA RRNA SPEC QL NAA+PROBE: NEGATIVE
SPECIMEN SOURCE: NORMAL

## 2025-03-13 NOTE — PROGRESS NOTES
[facility]  Preoperative Instructions        Surgery Date 3/27/2025   Time of Arrival to be called between 2pm & 5pm the day before your surgery.  Contact# [phone]    On the day of your surgery, please report to Surgical Services Registration Desk and sign in at your designated time.  The Surgery Center is located to the right of the Emergency Room.     2. You must have someone with you to drive you home. You should not drive a car for 24 hours following surgery. Please make arrangements for a friend or family member to stay with you for the first 24 hours after your surgery.    3. Do not have anything to eat or drink (including water, gum, mints, coffee, juice) after midnight ??    3/26/2025 .?This may not apply to medications prescribed by your physician. ?(Please note below the special instructions with medications to take the morning of your procedure.)    4. We recommend you do not drink any alcoholic beverages for 24 hours before and after your surgery.    5. Contact your surgeon's office for instructions on the following medications: non-steroidal anti-inflammatory drugs (i.e. Advil, Aleve), vitamins, and supplements. (Some surgeon's will want you to stop these medications prior to surgery and others may allow you to take them)  **If you are currently taking Plavix, Coumadin, Aspirin and/or other blood-thinning agents, contact your surgeon for instructions.** Your surgeon will partner with the physician prescribing these medications to determine if it is safe to stop or if you need to continue taking.  Please do not stop taking these medications without instructions from your surgeon    6. Wear comfortable clothes.  Wear glasses instead of contacts.  Do not bring any money or jewelry. Please bring picture ID, insurance card, and any prearranged co-payment or hospital payment.  Do not wear make-up, particularly mascara the morning of your surgery.  Do not wear nail polish,

## 2025-03-19 ENCOUNTER — PATIENT MESSAGE (OUTPATIENT)
Facility: CLINIC | Age: 25
End: 2025-03-19

## 2025-03-26 ENCOUNTER — ANESTHESIA EVENT (OUTPATIENT)
Facility: HOSPITAL | Age: 25
End: 2025-03-26
Payer: COMMERCIAL

## 2025-03-27 ENCOUNTER — ANESTHESIA (OUTPATIENT)
Facility: HOSPITAL | Age: 25
End: 2025-03-27
Payer: COMMERCIAL

## 2025-03-27 ENCOUNTER — HOSPITAL ENCOUNTER (OUTPATIENT)
Facility: HOSPITAL | Age: 25
Setting detail: OUTPATIENT SURGERY
Discharge: HOME | End: 2025-03-27
Attending: SURGERY | Admitting: SURGERY
Payer: COMMERCIAL

## 2025-03-27 VITALS
WEIGHT: 119.93 LBS | SYSTOLIC BLOOD PRESSURE: 142 MMHG | BODY MASS INDEX: 18.18 KG/M2 | DIASTOLIC BLOOD PRESSURE: 86 MMHG | HEIGHT: 68 IN | TEMPERATURE: 98.6 F | HEART RATE: 77 BPM | RESPIRATION RATE: 14 BRPM | OXYGEN SATURATION: 100 %

## 2025-03-27 DIAGNOSIS — K40.90 LEFT INGUINAL HERNIA: Primary | ICD-10-CM

## 2025-03-27 PROCEDURE — 3600000019 HC SURGERY ROBOT ADDTL 15MIN: Performed by: SURGERY

## 2025-03-27 PROCEDURE — 2500000003 HC RX 250 WO HCPCS: Performed by: SURGERY

## 2025-03-27 PROCEDURE — 6360000002 HC RX W HCPCS: Performed by: NURSE ANESTHETIST, CERTIFIED REGISTERED

## 2025-03-27 PROCEDURE — 2709999900 HC NON-CHARGEABLE SUPPLY: Performed by: SURGERY

## 2025-03-27 PROCEDURE — 6360000002 HC RX W HCPCS: Performed by: ANESTHESIOLOGY

## 2025-03-27 PROCEDURE — 49650 LAP ING HERNIA REPAIR INIT: CPT | Performed by: SURGERY

## 2025-03-27 PROCEDURE — 7100000001 HC PACU RECOVERY - ADDTL 15 MIN: Performed by: SURGERY

## 2025-03-27 PROCEDURE — 2500000003 HC RX 250 WO HCPCS: Performed by: NURSE ANESTHETIST, CERTIFIED REGISTERED

## 2025-03-27 PROCEDURE — 2580000003 HC RX 258: Performed by: NURSE ANESTHETIST, CERTIFIED REGISTERED

## 2025-03-27 PROCEDURE — 7100000000 HC PACU RECOVERY - FIRST 15 MIN: Performed by: SURGERY

## 2025-03-27 PROCEDURE — S2900 ROBOTIC SURGICAL SYSTEM: HCPCS | Performed by: SURGERY

## 2025-03-27 PROCEDURE — 6370000000 HC RX 637 (ALT 250 FOR IP): Performed by: SURGERY

## 2025-03-27 PROCEDURE — 3700000001 HC ADD 15 MINUTES (ANESTHESIA): Performed by: SURGERY

## 2025-03-27 PROCEDURE — 3700000000 HC ANESTHESIA ATTENDED CARE: Performed by: SURGERY

## 2025-03-27 PROCEDURE — 6370000000 HC RX 637 (ALT 250 FOR IP): Performed by: ANESTHESIOLOGY

## 2025-03-27 PROCEDURE — 3600000009 HC SURGERY ROBOT BASE: Performed by: SURGERY

## 2025-03-27 PROCEDURE — 6360000002 HC RX W HCPCS: Performed by: SURGERY

## 2025-03-27 PROCEDURE — C1781 MESH (IMPLANTABLE): HCPCS | Performed by: SURGERY

## 2025-03-27 PROCEDURE — 2580000003 HC RX 258: Performed by: ANESTHESIOLOGY

## 2025-03-27 DEVICE — LAPAROSCOPIC SELF-FIXATING MESH, RIGHT ANATOMICAL
Type: IMPLANTABLE DEVICE | Site: INGUINAL | Status: FUNCTIONAL
Brand: PROGRIP

## 2025-03-27 DEVICE — LAPAROSCOPIC SELF-FIXATING MESH, LEFT ANATOMICAL
Type: IMPLANTABLE DEVICE | Site: INGUINAL | Status: FUNCTIONAL
Brand: PROGRIP

## 2025-03-27 RX ORDER — LABETALOL HYDROCHLORIDE 5 MG/ML
10 INJECTION, SOLUTION INTRAVENOUS
Status: DISCONTINUED | OUTPATIENT
Start: 2025-03-27 | End: 2025-03-27 | Stop reason: HOSPADM

## 2025-03-27 RX ORDER — ONDANSETRON 2 MG/ML
INJECTION INTRAMUSCULAR; INTRAVENOUS
Status: DISCONTINUED | OUTPATIENT
Start: 2025-03-27 | End: 2025-03-27 | Stop reason: SDUPTHER

## 2025-03-27 RX ORDER — MIDAZOLAM HYDROCHLORIDE 1 MG/ML
INJECTION, SOLUTION INTRAMUSCULAR; INTRAVENOUS
Status: DISCONTINUED | OUTPATIENT
Start: 2025-03-27 | End: 2025-03-27 | Stop reason: SDUPTHER

## 2025-03-27 RX ORDER — FENTANYL CITRATE 50 UG/ML
25 INJECTION, SOLUTION INTRAMUSCULAR; INTRAVENOUS EVERY 5 MIN PRN
Status: DISCONTINUED | OUTPATIENT
Start: 2025-03-27 | End: 2025-03-27 | Stop reason: HOSPADM

## 2025-03-27 RX ORDER — NALOXONE HYDROCHLORIDE 0.4 MG/ML
INJECTION, SOLUTION INTRAMUSCULAR; INTRAVENOUS; SUBCUTANEOUS PRN
Status: DISCONTINUED | OUTPATIENT
Start: 2025-03-27 | End: 2025-03-27 | Stop reason: HOSPADM

## 2025-03-27 RX ORDER — PROPOFOL 10 MG/ML
INJECTION, EMULSION INTRAVENOUS
Status: DISCONTINUED | OUTPATIENT
Start: 2025-03-27 | End: 2025-03-27 | Stop reason: SDUPTHER

## 2025-03-27 RX ORDER — DEXMEDETOMIDINE HYDROCHLORIDE 100 UG/ML
INJECTION, SOLUTION INTRAVENOUS
Status: DISCONTINUED | OUTPATIENT
Start: 2025-03-27 | End: 2025-03-27 | Stop reason: SDUPTHER

## 2025-03-27 RX ORDER — ACETAMINOPHEN 500 MG
1000 TABLET ORAL
Status: COMPLETED | OUTPATIENT
Start: 2025-03-27 | End: 2025-03-27

## 2025-03-27 RX ORDER — SODIUM CHLORIDE, SODIUM LACTATE, POTASSIUM CHLORIDE, CALCIUM CHLORIDE 600; 310; 30; 20 MG/100ML; MG/100ML; MG/100ML; MG/100ML
INJECTION, SOLUTION INTRAVENOUS
Status: DISCONTINUED | OUTPATIENT
Start: 2025-03-27 | End: 2025-03-27 | Stop reason: SDUPTHER

## 2025-03-27 RX ORDER — HYDROMORPHONE HYDROCHLORIDE 1 MG/ML
0.5 INJECTION, SOLUTION INTRAMUSCULAR; INTRAVENOUS; SUBCUTANEOUS EVERY 5 MIN PRN
Status: DISCONTINUED | OUTPATIENT
Start: 2025-03-27 | End: 2025-03-27 | Stop reason: HOSPADM

## 2025-03-27 RX ORDER — FENTANYL CITRATE 50 UG/ML
INJECTION, SOLUTION INTRAMUSCULAR; INTRAVENOUS
Status: DISCONTINUED | OUTPATIENT
Start: 2025-03-27 | End: 2025-03-27 | Stop reason: SDUPTHER

## 2025-03-27 RX ORDER — POLYETHYLENE GLYCOL 3350 17 G/17G
17 POWDER, FOR SOLUTION ORAL DAILY
Qty: 116 G | Refills: 0 | Status: SHIPPED | OUTPATIENT
Start: 2025-03-27 | End: 2025-04-03

## 2025-03-27 RX ORDER — MIDAZOLAM HYDROCHLORIDE 5 MG/5ML
2 INJECTION, SOLUTION INTRAMUSCULAR; INTRAVENOUS
Status: DISCONTINUED | OUTPATIENT
Start: 2025-03-27 | End: 2025-03-27 | Stop reason: HOSPADM

## 2025-03-27 RX ORDER — SODIUM CHLORIDE, SODIUM LACTATE, POTASSIUM CHLORIDE, CALCIUM CHLORIDE 600; 310; 30; 20 MG/100ML; MG/100ML; MG/100ML; MG/100ML
INJECTION, SOLUTION INTRAVENOUS ONCE
Status: COMPLETED | OUTPATIENT
Start: 2025-03-27 | End: 2025-03-27

## 2025-03-27 RX ORDER — ONDANSETRON 2 MG/ML
4 INJECTION INTRAMUSCULAR; INTRAVENOUS
Status: DISCONTINUED | OUTPATIENT
Start: 2025-03-27 | End: 2025-03-27 | Stop reason: HOSPADM

## 2025-03-27 RX ORDER — KETOROLAC TROMETHAMINE 30 MG/ML
INJECTION, SOLUTION INTRAMUSCULAR; INTRAVENOUS
Status: DISCONTINUED | OUTPATIENT
Start: 2025-03-27 | End: 2025-03-27 | Stop reason: SDUPTHER

## 2025-03-27 RX ORDER — IBUPROFEN 600 MG/1
600 TABLET, FILM COATED ORAL EVERY 6 HOURS PRN
Qty: 20 TABLET | Refills: 0 | Status: SHIPPED | OUTPATIENT
Start: 2025-03-27

## 2025-03-27 RX ORDER — BETHANECHOL CHLORIDE 10 MG/1
10 TABLET ORAL
Status: COMPLETED | OUTPATIENT
Start: 2025-03-27 | End: 2025-03-27

## 2025-03-27 RX ORDER — ROCURONIUM BROMIDE 10 MG/ML
INJECTION, SOLUTION INTRAVENOUS
Status: DISCONTINUED | OUTPATIENT
Start: 2025-03-27 | End: 2025-03-27 | Stop reason: SDUPTHER

## 2025-03-27 RX ORDER — SODIUM CHLORIDE 0.9 % (FLUSH) 0.9 %
5-40 SYRINGE (ML) INJECTION EVERY 12 HOURS SCHEDULED
Status: DISCONTINUED | OUTPATIENT
Start: 2025-03-27 | End: 2025-03-27 | Stop reason: HOSPADM

## 2025-03-27 RX ORDER — SODIUM CHLORIDE 0.9 % (FLUSH) 0.9 %
5-40 SYRINGE (ML) INJECTION PRN
Status: DISCONTINUED | OUTPATIENT
Start: 2025-03-27 | End: 2025-03-27 | Stop reason: HOSPADM

## 2025-03-27 RX ORDER — OXYCODONE HYDROCHLORIDE 5 MG/1
5 TABLET ORAL
Status: COMPLETED | OUTPATIENT
Start: 2025-03-27 | End: 2025-03-27

## 2025-03-27 RX ORDER — PROCHLORPERAZINE EDISYLATE 5 MG/ML
5 INJECTION INTRAMUSCULAR; INTRAVENOUS
Status: DISCONTINUED | OUTPATIENT
Start: 2025-03-27 | End: 2025-03-27 | Stop reason: HOSPADM

## 2025-03-27 RX ORDER — TAMSULOSIN HYDROCHLORIDE 0.4 MG/1
0.4 CAPSULE ORAL
Status: COMPLETED | OUTPATIENT
Start: 2025-03-27 | End: 2025-03-27

## 2025-03-27 RX ORDER — DIPHENHYDRAMINE HYDROCHLORIDE 50 MG/ML
12.5 INJECTION, SOLUTION INTRAMUSCULAR; INTRAVENOUS
Status: DISCONTINUED | OUTPATIENT
Start: 2025-03-27 | End: 2025-03-27 | Stop reason: HOSPADM

## 2025-03-27 RX ORDER — SODIUM CHLORIDE 9 MG/ML
INJECTION, SOLUTION INTRAVENOUS PRN
Status: DISCONTINUED | OUTPATIENT
Start: 2025-03-27 | End: 2025-03-27 | Stop reason: HOSPADM

## 2025-03-27 RX ORDER — MEPERIDINE HYDROCHLORIDE 25 MG/ML
12.5 INJECTION INTRAMUSCULAR; INTRAVENOUS; SUBCUTANEOUS EVERY 5 MIN PRN
Status: DISCONTINUED | OUTPATIENT
Start: 2025-03-27 | End: 2025-03-27 | Stop reason: HOSPADM

## 2025-03-27 RX ORDER — DEXAMETHASONE SODIUM PHOSPHATE 4 MG/ML
INJECTION, SOLUTION INTRA-ARTICULAR; INTRALESIONAL; INTRAMUSCULAR; INTRAVENOUS; SOFT TISSUE
Status: DISCONTINUED | OUTPATIENT
Start: 2025-03-27 | End: 2025-03-27 | Stop reason: SDUPTHER

## 2025-03-27 RX ORDER — BUPIVACAINE HYDROCHLORIDE 5 MG/ML
INJECTION, SOLUTION EPIDURAL; INTRACAUDAL PRN
Status: DISCONTINUED | OUTPATIENT
Start: 2025-03-27 | End: 2025-03-27 | Stop reason: ALTCHOICE

## 2025-03-27 RX ORDER — LIDOCAINE HYDROCHLORIDE 20 MG/ML
INJECTION, SOLUTION EPIDURAL; INFILTRATION; INTRACAUDAL; PERINEURAL
Status: DISCONTINUED | OUTPATIENT
Start: 2025-03-27 | End: 2025-03-27 | Stop reason: SDUPTHER

## 2025-03-27 RX ORDER — OXYCODONE HYDROCHLORIDE 5 MG/1
5 TABLET ORAL EVERY 6 HOURS PRN
Qty: 20 TABLET | Refills: 0 | Status: SHIPPED | OUTPATIENT
Start: 2025-03-27 | End: 2025-04-01

## 2025-03-27 RX ADMIN — LIDOCAINE HYDROCHLORIDE 100 MG: 20 INJECTION, SOLUTION EPIDURAL; INFILTRATION; INTRACAUDAL; PERINEURAL at 12:35

## 2025-03-27 RX ADMIN — PROPOFOL 180 MG: 10 INJECTION, EMULSION INTRAVENOUS at 12:35

## 2025-03-27 RX ADMIN — TAMSULOSIN HYDROCHLORIDE 0.4 MG: 0.4 CAPSULE ORAL at 16:56

## 2025-03-27 RX ADMIN — OXYCODONE HYDROCHLORIDE 5 MG: 5 TABLET ORAL at 16:16

## 2025-03-27 RX ADMIN — WATER 2000 MG: 1 INJECTION INTRAMUSCULAR; INTRAVENOUS; SUBCUTANEOUS at 12:42

## 2025-03-27 RX ADMIN — FENTANYL CITRATE 100 MCG: 50 INJECTION, SOLUTION INTRAMUSCULAR; INTRAVENOUS at 12:34

## 2025-03-27 RX ADMIN — DEXAMETHASONE SODIUM PHOSPHATE 4 MG: 4 INJECTION INTRA-ARTICULAR; INTRALESIONAL; INTRAMUSCULAR; INTRAVENOUS; SOFT TISSUE at 12:48

## 2025-03-27 RX ADMIN — SODIUM CHLORIDE, POTASSIUM CHLORIDE, SODIUM LACTATE AND CALCIUM CHLORIDE: 600; 310; 30; 20 INJECTION, SOLUTION INTRAVENOUS at 12:08

## 2025-03-27 RX ADMIN — SODIUM CHLORIDE, SODIUM LACTATE, POTASSIUM CHLORIDE, AND CALCIUM CHLORIDE: .6; .31; .03; .02 INJECTION, SOLUTION INTRAVENOUS at 11:27

## 2025-03-27 RX ADMIN — ONDANSETRON 4 MG: 2 INJECTION INTRAMUSCULAR; INTRAVENOUS at 14:00

## 2025-03-27 RX ADMIN — HYDROMORPHONE HYDROCHLORIDE 0.5 MG: 1 INJECTION, SOLUTION INTRAMUSCULAR; INTRAVENOUS; SUBCUTANEOUS at 12:54

## 2025-03-27 RX ADMIN — SUGAMMADEX 200 MG: 100 INJECTION, SOLUTION INTRAVENOUS at 14:07

## 2025-03-27 RX ADMIN — FENTANYL CITRATE 25 MCG: 50 INJECTION INTRAMUSCULAR; INTRAVENOUS at 15:05

## 2025-03-27 RX ADMIN — MIDAZOLAM HYDROCHLORIDE 2 MG: 1 INJECTION, SOLUTION INTRAMUSCULAR; INTRAVENOUS at 12:30

## 2025-03-27 RX ADMIN — DEXMEDETOMIDINE 4 MCG: 100 INJECTION, SOLUTION INTRAVENOUS at 13:28

## 2025-03-27 RX ADMIN — ACETAMINOPHEN 1000 MG: 500 TABLET ORAL at 16:18

## 2025-03-27 RX ADMIN — FENTANYL CITRATE 25 MCG: 50 INJECTION INTRAMUSCULAR; INTRAVENOUS at 15:13

## 2025-03-27 RX ADMIN — DEXMEDETOMIDINE 4 MCG: 100 INJECTION, SOLUTION INTRAVENOUS at 12:47

## 2025-03-27 RX ADMIN — KETOROLAC TROMETHAMINE 30 MG: 30 INJECTION, SOLUTION INTRAMUSCULAR; INTRAVENOUS at 13:00

## 2025-03-27 RX ADMIN — BETHANECHOL CHLORIDE 10 MG: 10 TABLET ORAL at 16:56

## 2025-03-27 RX ADMIN — ROCURONIUM BROMIDE 50 MG: 10 INJECTION INTRAVENOUS at 12:36

## 2025-03-27 ASSESSMENT — PAIN DESCRIPTION - ORIENTATION
ORIENTATION: ANTERIOR
ORIENTATION: ANTERIOR;UPPER

## 2025-03-27 ASSESSMENT — PAIN DESCRIPTION - LOCATION
LOCATION: ABDOMEN
LOCATION: ABDOMEN

## 2025-03-27 ASSESSMENT — PAIN DESCRIPTION - DESCRIPTORS
DESCRIPTORS: ACHING
DESCRIPTORS: BURNING

## 2025-03-27 ASSESSMENT — PAIN SCALES - GENERAL
PAINLEVEL_OUTOF10: 8
PAINLEVEL_OUTOF10: 0
PAINLEVEL_OUTOF10: 8
PAINLEVEL_OUTOF10: 5
PAINLEVEL_OUTOF10: 8
PAINLEVEL_OUTOF10: 8
PAINLEVEL_OUTOF10: 3
PAINLEVEL_OUTOF10: 0

## 2025-03-27 NOTE — PERIOP NOTE
Rosalva Redcrest  2000  [id number]    Situation:  Verbal report given from: RN and CRNA  Procedure: Procedure(s):  ROBOTIC REPAIR OF LEFT INGUINAL HERNIA, POSSIBLE RIGHT WITH MESH    Background:    Preoperative diagnosis: Left inguinal hernia [K40.90]    Postoperative diagnosis: * No post-op diagnosis entered *    :  Dr. Niño    Assistant(s): Circulator: Vicki Javier RN  Surgical Assistant: Zena Marin Scrub: Isabella Ornelas RN  Scrub Person First: Gus Barba Surgical Assistant: Moreno áCrdenas    Specimens: * No specimens in log *    Assessment:  Intra-procedure medications         Anesthesia gave intra-procedure sedation and medications, see anesthesia flow sheet     Intravenous fluids: LR@ KVO     Vital signs stable Pt has oral airway but breathing well on own. Abdomen soft and flat and incisions dry and intact X3      Recommendation:    Permission to share finding with Mom and Dad :  yes

## 2025-03-27 NOTE — ANESTHESIA PRE PROCEDURE
Department of Anesthesiology  Preprocedure Note       Name:  Rosalva Toro   Age:  24 y.o.  :  2000                                          MRN:  [id number]         Date:  3/27/2025      Surgeon: Surgeon(s):  Alex Niño MD    Procedure: Procedure(s):  ROBOTIC REPAIR OF LEFT INGUINAL HERNIA, POSSIBLE RIGHT WITH MESH    Medications prior to admission:   Prior to Admission medications    Medication Sig Start Date End Date Taking? Authorizing Provider   vitamin D (ERGOCALCIFEROL) 1.25 MG (82469 UT) CAPS capsule Take 1 capsule by mouth once a week for 12 doses 3/10/25 5/27/25 Yes Nishant Dee APRN - NP   folic acid (FOLVITE) 1 MG tablet Take 1 tablet by mouth daily 3/10/25  Yes Nishant Dee APRN - NP       Current medications:    Current Facility-Administered Medications   Medication Dose Route Frequency Provider Last Rate Last Admin   • ceFAZolin (ANCEF) 2,000 mg in sterile water 20 mL IV syringe  2,000 mg IntraVENous Once Alex Niño MD           Allergies:  No Known Allergies    Problem List:    Patient Active Problem List   Diagnosis Code   • Oral lesion K13.70   • Dermatitis L30.9   • 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 No pertinent past medical history.    Past Surgical History:        Procedure Laterality Date   • COLONOSCOPY  2024       Social History:    Social History     Tobacco Use   • Smoking status: Never     Passive exposure: Never   • Smokeless tobacco: Never   Substance Use Topics   • Alcohol use: Never                                Counseling given: Not Answered      Vital Signs (Current):   Vitals:    25 1109 25 1055 25 1112   BP:   132/85   Pulse:   70   Resp:   16   Temp:   98 °F (36.7 °C)   TempSrc:   Oral   SpO2:   97%   Weight: 56.7 kg (125 lb) 54.4 kg (119 lb 14.9 oz)    Height: 1.727 m (5' 8\") 1.727 m (5' 7.99\")                                               BP

## 2025-03-27 NOTE — PERIOP NOTE
Pt. Alert. Denies severe pain or chill. Discharge instructions reviewed with caregiver and patient. Allowed and answered questions. Tolerating PO fluids. Both state ready for discharge. Given APAP and Oxycodone PO and escorted to BR to attempt to void. Has had 1250 cc IV and taking 480cc PO fluids. Report to NAYELI Maya RN to assure void before leaving-stressed to check IV site-make sure not infiltrated.

## 2025-03-27 NOTE — ANESTHESIA POSTPROCEDURE EVALUATION
Department of Anesthesiology  Postprocedure Note    Patient: Rosalva Toro  MRN: [id number]  YOB: 2000  Date of evaluation: 3/27/2025    Procedure Summary       Date: 03/27/25 Room / Location: [facility]\"" MAIN OR M1 / [facility] MAIN OR    Anesthesia Start: 1230 Anesthesia Stop: 1427    Procedure: ROBOTIC REPAIR OF LEFT INGUINAL HERNIA, POSSIBLE RIGHT WITH MESH (Bilateral: Abdomen) Diagnosis:       Left inguinal hernia      (Left inguinal hernia [K40.90])    Providers: Alex Niño MD Responsible Provider: Sheldon Kimble MD    Anesthesia Type: General ASA Status: 1            Anesthesia Type: General    Tamar Phase I: Tamar Score: 5    Tamar Phase II:      Anesthesia Post Evaluation    Patient location during evaluation: PACU  Patient participation: complete - patient participated  Level of consciousness: sleepy but conscious and responsive to verbal stimuli  Airway patency: patent  Nausea & Vomiting: no vomiting and no nausea  Cardiovascular status: blood pressure returned to baseline and hemodynamically stable  Respiratory status: acceptable  Hydration status: stable  Pain management: adequate    No notable events documented.

## 2025-03-27 NOTE — OP NOTE
DATE OF PROCEDURE:  3/27/2025    SURGEON: Alex Niño MD     PREOPERATIVE DIAGNOSIS: Symptomatic left inguinal hernia.     POSTOPERATIVE DIAGNOSIS: Symptomatic bilateral inguinal hernia.     OPERATIVE PROCEDURE: Robot-assisted laparoscopic bilateral inguinal hernia repair with mesh (CPT 95210)    ANESTHESIA: General endotracheal.     ESTIMATED BLOOD LOSS: Minimal.     IMPLANTS:     Implant Name Type Inv. Item Serial No.  Lot No. LRB No. Used Action   MESH CHINMAY LAP SELF FIXATING LT LUIS FERNANDO POLYLACTIC ACID PROGRIP - SNA  MESH CHINMAY LAP SELF FIXATING LT LUIS FERNANDO POLYLACTIC ACID PROGRIP NA [employer]  SURGICAL-WD [id number] Left 1 Implanted   MESH CHINMAY [id number] TEXTILE MFIL POLYETH TEREPHTHALATE - SNA  MESH CHINMAY [id number] TEXTILE MFIL POLYETH TEREPHTHALATE NA [employer]  SURGICAL-WD [id number] Right 1 Implanted       SPECIMENS:  * No specimens in log *     INDICATIONS: Groin pain with bulge.    FINDINGS: Large indirect left inguinal hernia.  Small direct right inguinal hernia.      DESCRIPTION OF THE PROCEDURE: After obtaining informed consent, the patient was taken to the operating room and placed supine on the operating table. An operative timeout was performed, and general endotracheal anesthesia was induced. Preoperative antibiotics were administered, and the abdomen was prepped and draped in the usual sterile fashion. An Ioban was employed.     The abdomen was then entered just above the umbilicus using an 8 mm optical trocar.  The abdomen was insufflated without incident and was visually explored.  No other gross pathology was visible.  Under direct vision 2 additional robotic ports were placed one on either side of the first.  Local anesthetic was used at each site prior to placement.      The robot was then docked with the patient in Trendelenburg position.  Using an atraumatic grasper and electrified remington the peritoneum cephalad to the hernia was incised near the left anterior

## 2025-03-27 NOTE — DISCHARGE INSTRUCTIONS
Discharge Instructions: Hernia -- Dr. Niño    >>>You received Toradol during your surgery. You may not take any form of NSAIDS (non steroidal anti inflammatory drugs) such as Advil, Ibuprofen, Aleve, Motrin until 7pm and take with food every 6 hours as prescribed.    >>>You received Tylenol 1000mg 4:15pm you may take Tylenol (or pain medication containing Tylenol or Acetaminophen) in 6 hours at 10:15 and take every 6 hours for 5 days.    You had 1 oxycodone at 4:15pm so not until 10:15pm    Call on next business day to arrange appointment for follow up in 3 weeks -- [phone].    Activity:  Walk regularly.  No lifting more than 10 pounds for 6 weeks.  Light aerobic activity is okay when you feel up to it.    You may resume driving in three days unless still requiring narcotics for pain.      Return to work in 1-2 weeks but no heavy lifting for 6 weeks.    Apply ice to areas of tenderness for 20 minutes at a time.  Keep a cloth between the skin and the bag of ice.      Work:  You may return to work in 1 or 2 weeks to light activity. No lifting more than 10 pounds (=\"light duty\") for 6 weeks.    If your employer can not accommodate \"light duty,\" your employer will need to provide any necessary paperwork to our office.   This document with serve as the initial \"note\" to your employer.    Diet:  You may resume normal diet.    Wound Care:  Glue dressing will fall off on its own.  If you experience a lot of drainage, develop redness around the wound, or a fever over 101 F occurs please call the office.  There will be significant swelling under the incision(s) that will develop over the next 3-4 days.  Ice will help reduce this.    Male patients who have inguinal hernia repairs may experience swelling and bruising of the scrotum -- this is normal.  However, if the scrotum becomes tense and painful you should call.  Wear a jock strap/athletic supporter for the next week to reduce scrotal swelling.  If you can't urinate

## 2025-03-28 ENCOUNTER — TELEPHONE (OUTPATIENT)
Age: 25
End: 2025-03-28

## 2025-03-28 NOTE — TELEPHONE ENCOUNTER
Informed patient there was no antibiotics ordered and he can alternate oxycodone  with ibuprofen as needed, do not remove glue dressing it will come off on its own. Express understanding.

## 2025-03-28 NOTE — TELEPHONE ENCOUNTER
Pt called and left voicemail to see if there was any preventative meds he should be taking other than the pain killers her was prescribed.

## 2025-04-03 ENCOUNTER — TELEPHONE (OUTPATIENT)
Age: 25
End: 2025-04-03

## 2025-04-03 NOTE — TELEPHONE ENCOUNTER
Informed patient it is normal to have pain, swelling and bruising post inguinal hernia surgery. States he is having severe pain, is following post op instructions. Appointment scheduled with Dr. Niño on Friday 4/4/24. Express understanding.

## 2025-04-03 NOTE — TELEPHONE ENCOUNTER
Pt said he still has some severe pain and swelling in his scrotum area and he wants to know if its normal

## 2025-04-04 ENCOUNTER — OFFICE VISIT (OUTPATIENT)
Age: 25
End: 2025-04-04

## 2025-04-04 VITALS
DIASTOLIC BLOOD PRESSURE: 86 MMHG | OXYGEN SATURATION: 98 % | SYSTOLIC BLOOD PRESSURE: 121 MMHG | RESPIRATION RATE: 14 BRPM | BODY MASS INDEX: 18.79 KG/M2 | HEIGHT: 68 IN | HEART RATE: 81 BPM | TEMPERATURE: 97.3 F | WEIGHT: 124 LBS

## 2025-04-04 DIAGNOSIS — Z48.89 POSTOPERATIVE VISIT: Primary | ICD-10-CM

## 2025-04-04 PROCEDURE — 99024 POSTOP FOLLOW-UP VISIT: CPT | Performed by: SURGERY

## 2025-04-04 ASSESSMENT — PATIENT HEALTH QUESTIONNAIRE - PHQ9
2. FEELING DOWN, DEPRESSED OR HOPELESS: NOT AT ALL
SUM OF ALL RESPONSES TO PHQ QUESTIONS 1-9: 0
1. LITTLE INTEREST OR PLEASURE IN DOING THINGS: NOT AT ALL

## 2025-04-10 ENCOUNTER — CLINICAL SUPPORT (OUTPATIENT)
Facility: CLINIC | Age: 25
End: 2025-04-10
Payer: COMMERCIAL

## 2025-04-10 VITALS
SYSTOLIC BLOOD PRESSURE: 133 MMHG | OXYGEN SATURATION: 98 % | HEIGHT: 68 IN | BODY MASS INDEX: 18.86 KG/M2 | DIASTOLIC BLOOD PRESSURE: 75 MMHG | RESPIRATION RATE: 18 BRPM | HEART RATE: 90 BPM | TEMPERATURE: 98 F

## 2025-04-10 PROCEDURE — 90471 IMMUNIZATION ADMIN: CPT | Performed by: INTERNAL MEDICINE

## 2025-04-10 PROCEDURE — 90651 9VHPV VACCINE 2/3 DOSE IM: CPT | Performed by: INTERNAL MEDICINE

## 2025-04-10 NOTE — PROGRESS NOTES
Rosalva Toro is a 24 y.o. male     Chief Complaint   Patient presents with    HPV #3       /75 (BP Site: Left Upper Arm, Patient Position: Sitting, BP Cuff Size: Medium Adult)   Pulse 90   Temp 98 °F (36.7 °C) (Temporal)   Resp 18   Ht 1.727 m (5' 7.99\")   SpO2 98%   BMI 18.86 kg/m²     Health Maintenance Due   Topic Date Due    Varicella vaccine (1 of 2 - 13+ 2-dose series) Never done    Hepatitis B vaccine (1 of 3 - 19+ 3-dose series) Never done    DTaP/Tdap/Td vaccine (1 - Tdap) Never done    COVID-19 Vaccine (1 - 2024-25 season) Never done    HPV vaccine (3 - Male 3-dose series) 04/10/2025         \"Have you been to the ER, urgent care clinic since your last visit?  Hospitalized since your last visit?\"    NO    “Have you seen or consulted any other health care providers outside of [facility] since your last visit?”    NO

## 2025-04-10 NOTE — PROGRESS NOTES
Identified pt with two pt identifiers (name and ). Reviewed chart in preparation for visit and have obtained necessary documentation.    Rosalva Toro is a 24 y.o. male  Chief Complaint   Patient presents with    Follow-up     S/P 25 bilateral robotic repair ING hernia repair w/mesh.     /86 (BP Site: Right Lower Arm, Patient Position: Sitting, BP Cuff Size: Small Adult)   Pulse 81   Temp 97.3 °F (36.3 °C) (Temporal)   Resp 14   Ht 1.727 m (5' 7.99\")   Wt 56.2 kg (124 lb)   SpO2 98%   BMI 18.86 kg/m²     1. Have you been to the ER, urgent care clinic since your last visit?  Hospitalized since your last visit?no    2. Have you seen or consulted any other health care providers outside of the [facility] since your last visit?  Include any pap smears or colon screening. no    
reassured that his condition will improve with time, although the process may be gradual. He was advised to apply ice to the affected area for 20-minute intervals and to continue his ibuprofen regimen. Once his ibuprofen supply is exhausted, he may transition to over-the-counter Advil, Motrin, or ibuprofen. After a month, if symptoms persist, he may switch from ice to moist heat therapy. He was also informed that he can resume golfing and gym activities, including leg exercises, at the 8-week edmundo post-surgery.    Follow-up  The patient is scheduled for a follow-up visit in 3 to 4 weeks.    PROCEDURE  The patient underwent robot-assisted bilateral inguinal hernia repair on 03/27/2025.    Please note that this dictation was at least partially completed with Dragon, the computer voice recognition software.  Quite often unanticipated grammatical, syntax, homophones, and other interpretive errors are inadvertently transcribed by the software.  Please disregard these errors.  Please excuse any errors that have escaped final proofreading.  The patient (or guardian, if applicable) and other individuals in attendance with the patient were advised that Artificial Intelligence will be utilized during this visit to record and process the conversation to generate a clinical note. The patient (or guardian, if applicable) and other individuals in attendance at the appointment consented to the use of AI, including the recording.

## 2025-05-01 ENCOUNTER — TELEMEDICINE (OUTPATIENT)
Facility: CLINIC | Age: 25
End: 2025-05-01

## 2025-05-01 DIAGNOSIS — Z87.19 STATUS POST HERNIA REPAIR: Primary | ICD-10-CM

## 2025-05-01 DIAGNOSIS — R10.9 ABDOMINAL DISCOMFORT: ICD-10-CM

## 2025-05-01 DIAGNOSIS — Z98.890 STATUS POST HERNIA REPAIR: Primary | ICD-10-CM

## 2025-05-01 DIAGNOSIS — E55.9 VITAMIN D DEFICIENCY: ICD-10-CM

## 2025-05-01 NOTE — PROGRESS NOTES
Rosalva Toro is a 24 y.o. male     Chief Complaint   Patient presents with    Abdominal Pain     Stomach pain x 4-5 weeks. Since hernia repair       There were no vitals taken for this visit.    Health Maintenance Due   Topic Date Due    Varicella vaccine (1 of 2 - 13+ 2-dose series) Never done    Hepatitis B vaccine (1 of 3 - 19+ 3-dose series) Never done    COVID-19 Vaccine (3 - 2024-25 season) 09/01/2024         \"Have you been to the ER, urgent care clinic since your last visit?  Hospitalized since your last visit?\"    NO    “Have you seen or consulted any other health care providers outside of [facility] since your last visit?”    NO

## 2025-05-01 NOTE — PROGRESS NOTES
Rosalva Toro, was evaluated through a synchronous (real-time) audio-video encounter. The patient (or guardian if applicable) is aware that this is a billable service, which includes applicable co-pays. This Virtual Visit was conducted with patient's (and/or legal guardian's) consent. Patient identification was verified, and a caregiver was present when appropriate.   The patient was located at Home: [address] [address]  Provider was located at Home (Appt Dept State[place]  Confirm you are appropriately licensed, registered, or certified to deliver care in the state where the patient is located as indicated above. If you are not or unsure, please re-schedule the visit: Yes, I confirm.     Rosalva Toro (:  2000) is a Established patient, presenting virtually for evaluation of the following:      Below is the assessment and plan developed based on review of pertinent history, physical exam, labs, studies, and medications.     Assessment & Plan  Status post hernia repair  Has follow-up with surgeon tomorrow         Abdominal discomfort    No alarm findings prior history, will examine next week and has follow-up with his surgeon tomorrow.  Recommended increasing his hydration, continue using high-fiber diet         Vitamin D deficiency    Continue vitamin D supplement, he will be due for recheck next week at his visit           Return for As scheduled.       Subjective   Established patient, would like to discuss several concerns.  Had inguinal hernia repair 2025, reports recovering well.  He is concerned about continued abdominal discomfort he describes as gas pain/distention.  Beginning of April he stopped taking pain medication and has been using MiraLAX to have BM.  He has had previous problems with constipation, but reports this has not been a concern.  He does have follow-up with the surgeon tomorrow.  He reports taking vitamin D once a week and unsure when the next 1 to

## 2025-05-02 ENCOUNTER — OFFICE VISIT (OUTPATIENT)
Age: 25
End: 2025-05-02

## 2025-05-02 VITALS
DIASTOLIC BLOOD PRESSURE: 85 MMHG | SYSTOLIC BLOOD PRESSURE: 120 MMHG | WEIGHT: 122 LBS | HEIGHT: 68 IN | TEMPERATURE: 97.9 F | HEART RATE: 84 BPM | BODY MASS INDEX: 18.49 KG/M2 | OXYGEN SATURATION: 97 %

## 2025-05-02 DIAGNOSIS — Z48.89 POSTOPERATIVE VISIT: Primary | ICD-10-CM

## 2025-05-02 PROCEDURE — 99024 POSTOP FOLLOW-UP VISIT: CPT | Performed by: SURGERY

## 2025-05-02 RX ORDER — TAPINAROF 10 MG/1000MG
CREAM TOPICAL
COMMUNITY
Start: 2025-04-21

## 2025-05-02 ASSESSMENT — PATIENT HEALTH QUESTIONNAIRE - PHQ9
SUM OF ALL RESPONSES TO PHQ QUESTIONS 1-9: 0
2. FEELING DOWN, DEPRESSED OR HOPELESS: NOT AT ALL
SUM OF ALL RESPONSES TO PHQ QUESTIONS 1-9: 0
SUM OF ALL RESPONSES TO PHQ QUESTIONS 1-9: 0
1. LITTLE INTEREST OR PLEASURE IN DOING THINGS: NOT AT ALL
SUM OF ALL RESPONSES TO PHQ QUESTIONS 1-9: 0

## 2025-05-02 NOTE — PROGRESS NOTES
Identified pt with two pt identifiers (name and ). Reviewed chart in preparation for visit and have obtained necessary documentation.    Rosalva Toro is a 24 y.o. male  Chief Complaint   Patient presents with    Post-Op Check     3wk robotic repair left inguinal hernia possible right with mesh 3/27     /85 (BP Site: Left Upper Arm, Patient Position: Sitting, BP Cuff Size: Small Adult)   Pulse 84   Temp 97.9 °F (36.6 °C) (Temporal)   Ht 1.727 m (5' 7.99\")   Wt 55.3 kg (122 lb)   SpO2 97%   BMI 18.56 kg/m²     1. Have you been to the ER, urgent care clinic since your last visit?  Hospitalized since your last visit?no    2. Have you seen or consulted any other health care providers outside of the [facility] since your last visit?  Include any pap smears or colon screening. no

## 2025-05-12 NOTE — PROGRESS NOTES
The patient presents for evaluation status post bilateral inguinal hernia repair on 03/27/2025.     He says he is making steady progress.  He still notices some discomfort but it has improved dramatically.    On exam, the incisions are well-healed and the repair is intact.  There is minimal swelling along the cord.    Assessment plan: Appropriate recovery.  Reminded of activity restrictions through 6 weeks from surgery.  Told to call with any concerns.  Thank you.

## 2025-05-20 ENCOUNTER — OFFICE VISIT (OUTPATIENT)
Facility: CLINIC | Age: 25
End: 2025-05-20
Payer: COMMERCIAL

## 2025-05-20 VITALS
WEIGHT: 123 LBS | OXYGEN SATURATION: 97 % | SYSTOLIC BLOOD PRESSURE: 128 MMHG | TEMPERATURE: 98.3 F | HEIGHT: 67 IN | HEART RATE: 100 BPM | RESPIRATION RATE: 16 BRPM | BODY MASS INDEX: 19.3 KG/M2 | DIASTOLIC BLOOD PRESSURE: 74 MMHG

## 2025-05-20 DIAGNOSIS — Z83.2 FAMILY HISTORY OF IRON DEFICIENCY: ICD-10-CM

## 2025-05-20 DIAGNOSIS — Z72.51 HIGH RISK SEXUAL BEHAVIOR, UNSPECIFIED TYPE: ICD-10-CM

## 2025-05-20 DIAGNOSIS — R10.9 ABDOMINAL DISCOMFORT: ICD-10-CM

## 2025-05-20 DIAGNOSIS — J31.2 CHRONIC PHARYNGITIS: ICD-10-CM

## 2025-05-20 DIAGNOSIS — E55.9 VITAMIN D DEFICIENCY: Primary | ICD-10-CM

## 2025-05-20 DIAGNOSIS — M25.521 RIGHT ELBOW PAIN: ICD-10-CM

## 2025-05-20 DIAGNOSIS — B00.9 HSV-1 INFECTION: ICD-10-CM

## 2025-05-20 LAB
COMMENT:: NORMAL
SPECIMEN HOLD: NORMAL

## 2025-05-20 PROCEDURE — 99213 OFFICE O/P EST LOW 20 MIN: CPT | Performed by: NURSE PRACTITIONER

## 2025-05-20 RX ORDER — VALACYCLOVIR HYDROCHLORIDE 1 G/1
TABLET, FILM COATED ORAL
Qty: 16 TABLET | Refills: 0 | Status: SHIPPED | OUTPATIENT
Start: 2025-05-20

## 2025-05-20 RX ORDER — ERGOCALCIFEROL 1.25 MG/1
50000 CAPSULE, LIQUID FILLED ORAL WEEKLY
Qty: 12 CAPSULE | Refills: 0 | Status: SHIPPED | OUTPATIENT
Start: 2025-05-20 | End: 2025-08-06

## 2025-05-20 RX ORDER — DICYCLOMINE HCL 20 MG
20 TABLET ORAL 3 TIMES DAILY PRN
Qty: 30 TABLET | Refills: 0 | Status: SHIPPED | OUTPATIENT
Start: 2025-05-20 | End: 2025-06-03

## 2025-05-20 ASSESSMENT — ENCOUNTER SYMPTOMS
CONSTIPATION: 0
ABDOMINAL PAIN: 1

## 2025-05-20 NOTE — PROGRESS NOTES
[facility] [place]  Office Note  Please note that this dictation was completed with [email or web address], the computer voice recognition software.  Quite often unanticipated grammatical, syntax, homophones, and other interpretive errors are inadvertently transcribed by the computer software.  Please disregard these errors.  Please excuse any errors that have escaped final proofreading.       History of present illness:Rosalva Toro is a 24 y.o. male presenting for Abdominal Pain (LLQ)    Established patient, past medical history HSV, vitamin D and folate deficiency, status post inguinal hernia repair.  He would like to discuss multiple concerns      Abdominal pain  Onset for many months  Describes as cramping to lower left quadrant  Worse with eating  Rest helps  No changes in bowel or bladder  He notes after recent hernia repair, his bowel movements have improved  Colonoscopy 4/12/2024, normal  Possible vague history of hpylori, states he completed treatment  Denies bloody stools    He would like refills of Valtrex for as needed and his vitamin D.    He would like to again discuss if he has a deviated septum or tonsil stones.    He inquires about having his iron checked.  States his sister has iron deficiency anemia    Right elbow popping  For many years  Difficulty bearing weight  No treatments  Previously an Breeze      Review of Systems   Constitutional: Negative.    Gastrointestinal:  Positive for abdominal pain. Negative for constipation.   Genitourinary: Negative.          History reviewed. No pertinent past medical history.    No Known Allergies     Prior to Admission medications    Medication Sig Start Date End Date Taking? Authorizing Provider   vitamin D (ERGOCALCIFEROL) 1.25 MG (14682 UT) CAPS capsule Take 1 capsule by mouth once a week for 12 doses 3/10/25 5/27/25 Yes Nishant Dee, IVA - NP   folic acid (FOLVITE) 1 MG tablet Take 1 tablet by mouth

## 2025-05-20 NOTE — PROGRESS NOTES
Rosalva Toro is a 24 y.o. male     Chief Complaint   Patient presents with    Abdominal Pain     LLQ       /74 (BP Site: Left Upper Arm, Patient Position: Sitting, BP Cuff Size: Medium Adult)   Pulse 100   Temp 98.3 °F (36.8 °C) (Oral)   Resp 16   Ht 1.702 m (5' 7\")   Wt 55.8 kg (123 lb)   SpO2 97%   BMI 19.26 kg/m²     Health Maintenance Due   Topic Date Due    Varicella vaccine (1 of 2 - 13+ 2-dose series) Never done    Hepatitis B vaccine (1 of 3 - 19+ 3-dose series) Never done    COVID-19 Vaccine (3 - 2024-25 season) 09/01/2024         \"Have you been to the ER, urgent care clinic since your last visit?  Hospitalized since your last visit?\"    NO    “Have you seen or consulted any other health care providers outside of [facility] since your last visit?”    NO

## 2025-05-21 ENCOUNTER — RESULTS FOLLOW-UP (OUTPATIENT)
Facility: CLINIC | Age: 25
End: 2025-05-21

## 2025-05-21 LAB
25(OH)D3 SERPL-MCNC: 52.2 NG/ML (ref 30–100)
ALBUMIN SERPL-MCNC: 5.1 G/DL (ref 3.5–5)
ALBUMIN/GLOB SERPL: 1.3 (ref 1.1–2.2)
ALP SERPL-CCNC: 67 U/L (ref 45–117)
ALT SERPL-CCNC: 26 U/L (ref 12–78)
ANION GAP SERPL CALC-SCNC: 9 MMOL/L (ref 2–12)
AST SERPL-CCNC: 19 U/L (ref 15–37)
BASOPHILS # BLD: 0.08 K/UL (ref 0–0.1)
BASOPHILS NFR BLD: 1.1 % (ref 0–1)
BILIRUB SERPL-MCNC: 1.1 MG/DL (ref 0.2–1)
BUN SERPL-MCNC: 14 MG/DL (ref 6–20)
BUN/CREAT SERPL: 14 (ref 12–20)
CALCIUM SERPL-MCNC: 10.3 MG/DL (ref 8.5–10.1)
CHLORIDE SERPL-SCNC: 102 MMOL/L (ref 97–108)
CO2 SERPL-SCNC: 26 MMOL/L (ref 21–32)
CREAT SERPL-MCNC: 0.98 MG/DL (ref 0.7–1.3)
DIFFERENTIAL METHOD BLD: ABNORMAL
EOSINOPHIL # BLD: 0.21 K/UL (ref 0–0.4)
EOSINOPHIL NFR BLD: 2.8 % (ref 0–7)
ERYTHROCYTE [DISTWIDTH] IN BLOOD BY AUTOMATED COUNT: 11.8 % (ref 11.5–14.5)
FERRITIN SERPL-MCNC: 96 NG/ML (ref 26–388)
GLOBULIN SER CALC-MCNC: 3.9 G/DL (ref 2–4)
GLUCOSE SERPL-MCNC: 90 MG/DL (ref 65–100)
HCT VFR BLD AUTO: 45.5 % (ref 36.6–50.3)
HCV AB SER IA-ACNC: 0.04 INDEX
HCV AB SERPL QL IA: NONREACTIVE
HGB BLD-MCNC: 15.1 G/DL (ref 12.1–17)
HIV 1+2 AB+HIV1 P24 AG SERPL QL IA: NONREACTIVE
HIV 1/2 RESULT COMMENT: NORMAL
IMM GRANULOCYTES # BLD AUTO: 0.01 K/UL (ref 0–0.04)
IMM GRANULOCYTES NFR BLD AUTO: 0.1 % (ref 0–0.5)
IRON SATN MFR SERPL: 28 % (ref 20–50)
IRON SERPL-MCNC: 122 UG/DL (ref 35–150)
LYMPHOCYTES # BLD: 3.52 K/UL (ref 0.8–3.5)
LYMPHOCYTES NFR BLD: 46.4 % (ref 12–49)
MCH RBC QN AUTO: 28.7 PG (ref 26–34)
MCHC RBC AUTO-ENTMCNC: 33.2 G/DL (ref 30–36.5)
MCV RBC AUTO: 86.5 FL (ref 80–99)
MONOCYTES # BLD: 0.5 K/UL (ref 0–1)
MONOCYTES NFR BLD: 6.6 % (ref 5–13)
NEUTS SEG # BLD: 3.26 K/UL (ref 1.8–8)
NEUTS SEG NFR BLD: 43 % (ref 32–75)
NRBC # BLD: 0 K/UL (ref 0–0.01)
NRBC BLD-RTO: 0 PER 100 WBC
PLATELET # BLD AUTO: 282 K/UL (ref 150–400)
PMV BLD AUTO: 11.2 FL (ref 8.9–12.9)
POTASSIUM SERPL-SCNC: 3.8 MMOL/L (ref 3.5–5.1)
PROT SERPL-MCNC: 9 G/DL (ref 6.4–8.2)
RBC # BLD AUTO: 5.26 M/UL (ref 4.1–5.7)
SODIUM SERPL-SCNC: 137 MMOL/L (ref 136–145)
TIBC SERPL-MCNC: 432 UG/DL (ref 250–450)
WBC # BLD AUTO: 7.6 K/UL (ref 4.1–11.1)

## 2025-05-22 LAB
BACTERIA SPEC CULT: ABNORMAL
BACTERIA SPEC CULT: ABNORMAL
SERVICE CMNT-IMP: ABNORMAL

## 2025-05-23 DIAGNOSIS — R10.9 ABDOMINAL DISCOMFORT: ICD-10-CM

## 2025-05-26 LAB
H PYLORI AG STL QL IA: NEGATIVE
SPECIMEN SOURCE: NORMAL

## 2025-05-27 LAB
BACTERIA SPEC CULT: NORMAL
CAMPYLOBACTER STL CULT: NORMAL
E COLI SXT STL QL IA: NEGATIVE
SALMONELLA/SHIGELLA SCREEN: NORMAL
SPECIMEN SOURCE: NORMAL

## 2025-05-29 ENCOUNTER — OFFICE VISIT (OUTPATIENT)
Facility: CLINIC | Age: 25
End: 2025-05-29
Payer: COMMERCIAL

## 2025-05-29 VITALS
SYSTOLIC BLOOD PRESSURE: 124 MMHG | HEIGHT: 67 IN | HEART RATE: 72 BPM | RESPIRATION RATE: 16 BRPM | OXYGEN SATURATION: 98 % | DIASTOLIC BLOOD PRESSURE: 70 MMHG | BODY MASS INDEX: 19.79 KG/M2 | WEIGHT: 126.1 LBS | TEMPERATURE: 98.3 F

## 2025-05-29 DIAGNOSIS — R10.9 ABDOMINAL DISCOMFORT: Primary | ICD-10-CM

## 2025-05-29 DIAGNOSIS — K64.4 EXTERNAL HEMORRHOID: ICD-10-CM

## 2025-05-29 PROCEDURE — 99213 OFFICE O/P EST LOW 20 MIN: CPT | Performed by: NURSE PRACTITIONER

## 2025-05-29 RX ORDER — HYDROCORTISONE 25 MG/G
CREAM TOPICAL
Qty: 28 G | Refills: 0 | Status: SHIPPED | OUTPATIENT
Start: 2025-05-29

## 2025-05-29 NOTE — PROGRESS NOTES
[facility] [place]  Office Note  Please note that this dictation was completed with [employer], the computer voice recognition software.  Quite often unanticipated grammatical, syntax, homophones, and other interpretive errors are inadvertently transcribed by the computer software.  Please disregard these errors.  Please excuse any errors that have escaped final proofreading.       History of present illness:Rosalva Toro is a 24 y.o. male presenting for Discuss Labs    Established patient, past medical history HSV, vitamin D and folate deficiency, status post inguinal hernia repair.  He would like to review his labs and reports a recent hemorrhoid.        Abdominal pain  He reports trying Bentyl with no relief  He is back to normal activities like playing golf, tolerating well  He reports external hemorrhoid that self resolved but would like something to have on hand to help  He denies constipation    Previous history-  Onset for many months  Describes as cramping to lower left quadrant  Worse with eating  Rest helps  No changes in bowel or bladder  He notes after recent hernia repair, his bowel movements have improved  Colonoscopy 4/12/2024, normal  Possible vague history of hpylori, states he completed treatment  Denies bloody stools    He would like for me to examine his tongue.  Reports white coating    He would like to know if there is anything else to address with his HSV 1 diagnosis.    He inquires if he has diabetes and what he should do to prevent diabetes.    Review of Systems   Constitutional: Negative.    HENT: Negative.           No past medical history on file.    No Known Allergies     Prior to Admission medications    Medication Sig Start Date End Date Taking? Authorizing Provider   dicyclomine (BENTYL) 20 MG tablet Take 1 tablet by mouth 3 times daily as needed (abdominal pain) 5/20/25 6/3/25 Yes Nishant Dee APRN - JUNE   valACYclovir

## 2025-05-29 NOTE — PROGRESS NOTES
Rosalva Toro is a 24 y.o. male     Chief Complaint   Patient presents with    Discuss Labs       /70 (BP Site: Left Upper Arm, Patient Position: Sitting, BP Cuff Size: Medium Adult)   Pulse 72   Temp 98.3 °F (36.8 °C) (Oral)   Resp 16   Ht 1.702 m (5' 7\")   Wt 57.2 kg (126 lb 1.6 oz)   SpO2 98%   BMI 19.75 kg/m²     Health Maintenance Due   Topic Date Due    Varicella vaccine (1 of 2 - 13+ 2-dose series) Never done    Hepatitis B vaccine (1 of 3 - 19+ 3-dose series) Never done    COVID-19 Vaccine (3 - 2024-25 season) 09/01/2024         \"Have you been to the ER, urgent care clinic since your last visit?  Hospitalized since your last visit?\"    NO    “Have you seen or consulted any other health care providers outside of [facility] since your last visit?”    NO

## 2025-06-02 ENCOUNTER — OFFICE VISIT (OUTPATIENT)
Facility: CLINIC | Age: 25
End: 2025-06-02
Payer: COMMERCIAL

## 2025-06-02 VITALS
RESPIRATION RATE: 16 BRPM | DIASTOLIC BLOOD PRESSURE: 72 MMHG | TEMPERATURE: 98.6 F | WEIGHT: 128.3 LBS | HEART RATE: 76 BPM | OXYGEN SATURATION: 98 % | SYSTOLIC BLOOD PRESSURE: 124 MMHG | BODY MASS INDEX: 20.14 KG/M2 | HEIGHT: 67 IN

## 2025-06-02 DIAGNOSIS — N48.9 PENILE LESION: Primary | ICD-10-CM

## 2025-06-02 DIAGNOSIS — R45.89 ANXIETY ABOUT HEALTH: ICD-10-CM

## 2025-06-02 PROCEDURE — 99213 OFFICE O/P EST LOW 20 MIN: CPT | Performed by: NURSE PRACTITIONER

## 2025-06-02 NOTE — PROGRESS NOTES
[facility] [place]  Office Note  Please note that this dictation was completed with [employer], the computer voice recognition software.  Quite often unanticipated grammatical, syntax, homophones, and other interpretive errors are inadvertently transcribed by the computer software.  Please disregard these errors.  Please excuse any errors that have escaped final proofreading.       History of present illness:Rosalva Toro is a 24 y.o. male presenting for Skin Lesions (Penile skin lesion x 2 days. Stinging sensation)      Established patient, history of anxiety surrounding health.  Additional history includes vitamin D deficiency, HSV 1.  Today he would like to have \"a lesion\" on his penis examined.  He reports onset 3 days, first noticed in the shower, associated redness and pain.  He does note it has started to resolve.  He denies any new sexual contacts or skin contacts.  Has had recent negative full STI panel 12 days ago.He does endorse masturbating with a lubricant. Dixon Escalona LPN accompanied myself for history and PE.    Review of Systems   Genitourinary:  Negative for penile discharge.         History reviewed. No pertinent past medical history.    No Known Allergies     Prior to Admission medications    Medication Sig Start Date End Date Taking? Authorizing Provider   ammonium lactate (LAC-HYDRIN) 12 % lotion Apply topically as needed 5/16/25  Yes ProviderTracie MD   triamcinolone (KENALOG) 0.1 % cream Apply topically 2 times daily 5/14/25  Yes Provider, MD Tracie   dicyclomine (BENTYL) 20 MG tablet Take 1 tablet by mouth 3 times daily as needed (abdominal pain) 5/20/25 6/3/25 Yes Nishant Dee APRN - NP   vitamin D (ERGOCALCIFEROL) 1.25 MG (71727 UT) CAPS capsule Take 1 capsule by mouth once a week for 12 doses 5/20/25 8/6/25 Yes Nishant Dee APRN - NP   valACYclovir (VALTREX) 1 g tablet Take 2 tablets twice a day x one day for

## 2025-06-02 NOTE — PROGRESS NOTES
Rosalva Toro is a 24 y.o. male     Chief Complaint   Patient presents with    Skin Lesions     Penile skin lesion x 2 days. Stinging sensation       /72 (BP Site: Left Upper Arm, Patient Position: Sitting, BP Cuff Size: Medium Adult)   Pulse 76   Temp 98.6 °F (37 °C) (Oral)   Resp 16   Ht 1.702 m (5' 7\")   Wt 58.2 kg (128 lb 4.8 oz)   SpO2 98%   BMI 20.09 kg/m²     Health Maintenance Due   Topic Date Due    Varicella vaccine (1 of 2 - 13+ 2-dose series) Never done    Hepatitis B vaccine (1 of 3 - 19+ 3-dose series) Never done    COVID-19 Vaccine (3 - 2024-25 season) 09/01/2024         \"Have you been to the ER, urgent care clinic since your last visit?  Hospitalized since your last visit?\"    NO    “Have you seen or consulted any other health care providers outside of [facility] since your last visit?”    NO

## 2025-06-05 ENCOUNTER — RESULTS FOLLOW-UP (OUTPATIENT)
Facility: CLINIC | Age: 25
End: 2025-06-05

## 2025-06-09 ENCOUNTER — OFFICE VISIT (OUTPATIENT)
Age: 25
End: 2025-06-09

## 2025-06-09 VITALS
SYSTOLIC BLOOD PRESSURE: 123 MMHG | DIASTOLIC BLOOD PRESSURE: 80 MMHG | BODY MASS INDEX: 19.34 KG/M2 | WEIGHT: 123.2 LBS | HEART RATE: 81 BPM | TEMPERATURE: 98.3 F | OXYGEN SATURATION: 98 % | HEIGHT: 67 IN

## 2025-06-09 DIAGNOSIS — Z48.89 POSTOPERATIVE VISIT: Primary | ICD-10-CM

## 2025-06-09 PROCEDURE — 99024 POSTOP FOLLOW-UP VISIT: CPT | Performed by: SURGERY

## 2025-06-11 NOTE — PROGRESS NOTES
Identified pt with two pt identifiers (name and ). Reviewed chart in preparation for visit and have obtained necessary documentation.    Rosalva Toro is a 24 y.o. male  Chief Complaint   Patient presents with    Post-Op Check     2month robotic repair left inguinal hernia possible right with mesh 3/27       /80 (BP Site: Right Upper Arm, Patient Position: Sitting, BP Cuff Size: Small Adult)   Pulse 81   Temp 98.3 °F (36.8 °C) (Temporal)   Ht 1.702 m (5' 7\")   Wt 55.9 kg (123 lb 3.2 oz)   SpO2 98%   BMI 19.30 kg/m²     1. Have you been to the ER, urgent care clinic since your last visit?  Hospitalized since your last visit?no    2. Have you seen or consulted any other health care providers outside of the [facility] since your last visit?  Include any pap smears or colon screening. no   
transcribed by the software.  Please disregard these errors.  Please excuse any errors that have escaped final proofreading.  The patient (or guardian, if applicable) and other individuals in attendance with the patient were advised that Artificial Intelligence will be utilized during this visit to record and process the conversation to generate a clinical note. The patient (or guardian, if applicable) and other individuals in attendance at the appointment consented to the use of AI, including the recording.

## 2025-08-14 ENCOUNTER — TELEPHONE (OUTPATIENT)
Age: 25
End: 2025-08-14

## (undated) DEVICE — SUTURE V-LOC 180 SZ 0 L9IN ABSRB GRN GS-21 L37MM 1/2 CIR VLOCL0346

## (undated) DEVICE — KIT,1200CC CANISTER,3/16"X6' TUBING: Brand: MEDLINE INDUSTRIES, INC.

## (undated) DEVICE — PAD PT POS 36 IN SURGYPAD DISP

## (undated) DEVICE — TIP COVER ACCESSORY

## (undated) DEVICE — SYRINGE MED 10ML LUERLOCK TIP W/O SFTY DISP

## (undated) DEVICE — SOLUTION SURG PREP 26 CC PURPREP

## (undated) DEVICE — GLOVE SURG SZ 85 CRM LTX FREE POLYISOPRENE POLYMER BEAD ANTI

## (undated) DEVICE — GOWN,SIRUS,NONRNF,SETINSLV,2XL,18/CS: Brand: MEDLINE

## (undated) DEVICE — COVER,MAYO STAND,STERILE: Brand: MEDLINE

## (undated) DEVICE — HYPODERMIC SAFETY NEEDLE: Brand: MAGELLAN

## (undated) DEVICE — GENERAL LAPAROSCOPY-MRMC: Brand: MEDLINE INDUSTRIES, INC.

## (undated) DEVICE — SOLUTION IRRIG 1000ML H2O PIC PLAS SHATTERPROOF CONTAINER

## (undated) DEVICE — SEAL

## (undated) DEVICE — GLOVE SURG SZ 8 CRM LTX FREE POLYISOPRENE POLYMER BEAD ANTI

## (undated) DEVICE — LIQUIBAND RAPID ADHESIVE 36/CS 0.8ML: Brand: MEDLINE

## (undated) DEVICE — 3M™ IOBAN™ 2 ANTIMICROBIAL INCISE DRAPE 6650EZ: Brand: IOBAN™ 2

## (undated) DEVICE — ARM DRAPE

## (undated) DEVICE — BLADE CLIPPER GEN PURP NS

## (undated) DEVICE — SOLUTION ANTIFOG VIS SYS CLEARIFY LAPSCP

## (undated) DEVICE — SUTURE MONOCRYL SZ 4-0 L27IN ABSRB UD L19MM PS-2 1/2 CIR PRIM Y426H

## (undated) DEVICE — TOWEL,OR,DSP,ST,BLUE,STD,4/PK,20PK/CS: Brand: MEDLINE

## (undated) DEVICE — SYSTEM EVAC SMOKE LAPARSCOPIC

## (undated) DEVICE — BLADELESS OBTURATOR: Brand: WECK VISTA